# Patient Record
Sex: FEMALE | HISPANIC OR LATINO | Employment: UNEMPLOYED | URBAN - METROPOLITAN AREA
[De-identification: names, ages, dates, MRNs, and addresses within clinical notes are randomized per-mention and may not be internally consistent; named-entity substitution may affect disease eponyms.]

---

## 2017-01-02 ENCOUNTER — HOSPITAL ENCOUNTER (EMERGENCY)
Facility: HOSPITAL | Age: 37
Discharge: HOME | End: 2017-01-02
Attending: EMERGENCY MEDICINE | Admitting: EMERGENCY MEDICINE
Payer: COMMERCIAL

## 2017-01-02 ENCOUNTER — APPOINTMENT (EMERGENCY)
Dept: RADIOLOGY | Facility: HOSPITAL | Age: 37
End: 2017-01-02
Payer: COMMERCIAL

## 2017-01-02 VITALS
OXYGEN SATURATION: 98 % | DIASTOLIC BLOOD PRESSURE: 102 MMHG | TEMPERATURE: 97.8 F | SYSTOLIC BLOOD PRESSURE: 173 MMHG | HEART RATE: 70 BPM | RESPIRATION RATE: 18 BRPM

## 2017-01-02 DIAGNOSIS — S82.65XA CLOSED NONDISPLACED FRACTURE OF LATERAL MALLEOLUS OF LEFT FIBULA, INITIAL ENCOUNTER: Primary | ICD-10-CM

## 2017-01-02 PROCEDURE — 73590 X-RAY EXAM OF LOWER LEG: CPT

## 2017-01-02 PROCEDURE — 99283 EMERGENCY DEPT VISIT LOW MDM: CPT

## 2017-01-02 PROCEDURE — 73610 X-RAY EXAM OF ANKLE: CPT

## 2017-01-02 RX ORDER — TRAMADOL HYDROCHLORIDE 50 MG/1
50 TABLET ORAL ONCE
Status: COMPLETED | OUTPATIENT
Start: 2017-01-02 | End: 2017-01-02

## 2017-01-02 RX ORDER — MORPHINE SULFATE 15 MG/1
7.5 TABLET ORAL EVERY 4 HOURS PRN
Qty: 5 TABLET | Refills: 0 | Status: SHIPPED | OUTPATIENT
Start: 2017-01-02 | End: 2017-01-12

## 2017-01-02 RX ADMIN — TRAMADOL HYDROCHLORIDE 50 MG: 50 TABLET ORAL at 19:31

## 2017-11-23 ENCOUNTER — HOSPITAL ENCOUNTER (EMERGENCY)
Facility: HOSPITAL | Age: 37
Discharge: HOME | End: 2017-11-23
Attending: EMERGENCY MEDICINE | Admitting: EMERGENCY MEDICINE
Payer: COMMERCIAL

## 2017-11-23 ENCOUNTER — APPOINTMENT (EMERGENCY)
Dept: RADIOLOGY | Facility: HOSPITAL | Age: 37
End: 2017-11-23
Payer: COMMERCIAL

## 2017-11-23 VITALS
HEIGHT: 64 IN | SYSTOLIC BLOOD PRESSURE: 189 MMHG | HEART RATE: 76 BPM | WEIGHT: 200 LBS | BODY MASS INDEX: 34.15 KG/M2 | DIASTOLIC BLOOD PRESSURE: 112 MMHG | RESPIRATION RATE: 19 BRPM | OXYGEN SATURATION: 97 %

## 2017-11-23 DIAGNOSIS — B34.9 VIRAL SYNDROME: Primary | ICD-10-CM

## 2017-11-23 PROCEDURE — 96372 THER/PROPH/DIAG INJ SC/IM: CPT

## 2017-11-23 PROCEDURE — 71020 HB CHEST X-RAY 2VW FRONTAL&LATL: CPT

## 2017-11-23 PROCEDURE — 93005 ELECTROCARDIOGRAM TRACING: CPT

## 2017-11-23 PROCEDURE — 99283 EMERGENCY DEPT VISIT LOW MDM: CPT

## 2017-11-23 PROCEDURE — 94640 AIRWAY INHALATION TREATMENT: CPT

## 2017-11-23 RX ORDER — ALBUTEROL SULFATE 90 UG/1
2 AEROSOL, METERED RESPIRATORY (INHALATION) EVERY 4 HOURS PRN
Qty: 1 INHALER | Refills: 0 | Status: SHIPPED | OUTPATIENT
Start: 2017-11-23 | End: 2020-10-12 | Stop reason: ALTCHOICE

## 2017-11-23 RX ORDER — KETOROLAC TROMETHAMINE 30 MG/ML
15 INJECTION, SOLUTION INTRAMUSCULAR; INTRAVENOUS ONCE
Status: COMPLETED | OUTPATIENT
Start: 2017-11-23 | End: 2017-11-23

## 2017-11-23 RX ORDER — ALBUTEROL SULFATE 2.5 MG/3ML
5 SOLUTION RESPIRATORY (INHALATION) ONCE
Status: COMPLETED | OUTPATIENT
Start: 2017-11-23 | End: 2017-11-23

## 2017-11-23 RX ORDER — KETOROLAC TROMETHAMINE 30 MG/ML
15 INJECTION, SOLUTION INTRAMUSCULAR; INTRAVENOUS ONCE
Status: DISCONTINUED | OUTPATIENT
Start: 2017-11-23 | End: 2017-11-23

## 2017-11-23 RX ADMIN — KETOROLAC TROMETHAMINE 15 MG: 30 INJECTION, SOLUTION INTRAMUSCULAR at 06:28

## 2017-11-23 RX ADMIN — IPRATROPIUM BROMIDE 0.5 MG: 0.5 SOLUTION RESPIRATORY (INHALATION) at 06:22

## 2017-11-23 RX ADMIN — ALBUTEROL SULFATE 5 MG: 2.5 SOLUTION RESPIRATORY (INHALATION) at 06:22

## 2017-11-23 NOTE — ED PROVIDER NOTES
History  Chief Complaint   Patient presents with    Cough     Pt  reports "chest pain while coughing" for 3 days  Also complains of Sore Throat  Denies SOB, fever/chills  38-year-old female presents to the emergency department for evaluation of cough and congestion over the past several days  She has had increased nasal congestion sore throat and cough worse at night but is nonproductive  She has had no fevers chills  She has had no sick contacts  She is otherwise healthy  None       Past Medical History:   Diagnosis Date    Hypertension        History reviewed  No pertinent surgical history  History reviewed  No pertinent family history  I have reviewed and agree with the history as documented  Social History   Substance Use Topics    Smoking status: Current Some Day Smoker    Smokeless tobacco: Never Used    Alcohol use Yes      Comment: social         Review of Systems   Constitutional: Negative for appetite change, chills, fatigue and fever  HENT: Positive for congestion  Negative for sneezing and sore throat  Eyes: Negative for visual disturbance  Respiratory: Positive for cough and chest tightness  Negative for choking, shortness of breath and wheezing  Cardiovascular: Negative for chest pain and palpitations  Gastrointestinal: Negative for abdominal pain, constipation, diarrhea, nausea and vomiting  Genitourinary: Negative for difficulty urinating and dysuria  Neurological: Negative for dizziness, weakness, light-headedness, numbness and headaches  All other systems reviewed and are negative        Physical Exam  ED Triage Vitals [11/23/17 0517]   Temp Pulse Respirations Blood Pressure SpO2   -- 76 19 (!) 189/112 97 %      Temp src Heart Rate Source Patient Position - Orthostatic VS BP Location FiO2 (%)   -- Monitor Sitting Right arm --      Pain Score       7           Orthostatic Vital Signs  Vitals:    11/23/17 0517   BP: (!) 189/112   Pulse: 76   Patient Position - Orthostatic VS: Sitting       Physical Exam   Constitutional: She is oriented to person, place, and time  She appears well-developed and well-nourished  No distress  HENT:   Head: Normocephalic and atraumatic  Nose: Mucosal edema and rhinorrhea present  Mouth/Throat: Oropharynx is clear and moist    Eyes: EOM are normal  Pupils are equal, round, and reactive to light  Neck: No JVD present  No tracheal deviation present  Cardiovascular: Normal rate, regular rhythm, normal heart sounds and intact distal pulses  Exam reveals no gallop and no friction rub  No murmur heard  Pulmonary/Chest: Effort normal and breath sounds normal  No respiratory distress  She has no wheezes  She has no rales  Abdominal: Soft  Bowel sounds are normal  She exhibits no distension  There is no tenderness  There is no rebound and no guarding  Neurological: She is alert and oriented to person, place, and time  No cranial nerve deficit  She exhibits normal muscle tone  Skin: Skin is warm and dry  She is not diaphoretic  No pallor  Psychiatric: She has a normal mood and affect  Her behavior is normal    Nursing note and vitals reviewed  ED Medications  Medications   albuterol inhalation solution 5 mg (5 mg Nebulization Given 11/23/17 0622)   ipratropium (ATROVENT) 0 02 % inhalation solution 0 5 mg (0 5 mg Nebulization Given 11/23/17 0622)   ketorolac (TORADOL) 30 mg/mL injection 15 mg (15 mg Intramuscular Given 11/23/17 5893)       Diagnostic Studies  Results Reviewed     None                 XR chest 2 views   ED Interpretation by Boris Camarena MD (11/23 5590)   No active pulmonary disease      Final Result by Brenda Garcia DO (11/23 0376)      No active pulmonary disease           Workstation performed: [id number]               Procedures  Procedures      Phone Consults  ED Phone Contact    ED Course  ED Course                                MDM  Number of Diagnoses or Management Options  Viral syndrome: Diagnosis management comments: 18-year-old female with cough nasal congestion and chest tightness  Most likely viral will check chest x-ray for evidence of consolidation  Will give nebs for cough and nasal decongestants likely discharge with PCP follow-up    CritCare Time    Disposition  Final diagnoses:   Viral syndrome     Time reflects when diagnosis was documented in both MDM as applicable and the Disposition within this note     Time User Action Codes Description Comment    11/23/2017  7:25 AM Jevon Harkins Add [B34 9] Viral syndrome       ED Disposition     ED Disposition Condition Comment    Discharge  Lit Musa discharge to home/self care  Condition at discharge: Stable        Follow-up Information     Follow up With Specialties Details Why Contact Info    Page Mantilla MD Family Medicine  As needed [address] [facility] [address] [facility] [place]  [phone]          Discharge Medication List as of 11/23/2017  7:28 AM      START taking these medications    Details   albuterol (PROVENTIL HFA,VENTOLIN HFA) 90 mcg/act inhaler Inhale 2 puffs every 4 (four) hours as needed for wheezing, Starting Thu 11/23/2017, Print           No discharge procedures on file  ED Provider  Attending physically available and evaluated Lit Musa  BROOKS managed the patient along with the ED Attending      Electronically Signed by         Kyra Conner MD  Resident  11/25/17 0765

## 2017-11-23 NOTE — ED NOTES
EKG completed 3526  Viewed and signed by Dr Renée Maldonado on chart     [facility]  11/23/17 2889

## 2017-11-23 NOTE — DISCHARGE INSTRUCTIONS
Viral Syndrome, Ambulatory Care   GENERAL INFORMATION:   Viral syndrome  is a term healthcare providers use for general symptoms of a viral infection that has no clear cause  Common symptoms include the following:   · Fever and chills, or a rash    · Runny or stuffy nose     · Cough, sore throat, or hoarseness     · Headache, or pain and pressure around your eyes     · Muscle aches and joint pain     · Shortness of breath or wheezing     · Abdominal pain, cramps, and diarrhea     · Nausea, vomiting, or loss of appetite  Seek immediate care for the following symptoms:   · Continued vomiting and diarrhea    · Chest pain or trouble breathing  Treatment for a viral syndrome  may include medicines to decrease fever, cough, or stuffy nose  You may also need medicines to relieve a rash, itching, or help treat the viral infection  Manage your symptoms:  Drink liquids as directed to help prevent dehydration  Ask how much liquid to drink each day and which liquids are best for you  You may need to drink an oral rehydration solution (ORS)  An ORS has the right amounts of water, salts, and sugar you need to replace body fluids  Prevent the spread of viral syndrome:   · Wash your hands often  Use soap and water  Wash your hands after you use the bathroom, change a child's diapers, or sneeze  Wash your hands before you prepare or eat food  Use a gel hand  if soap and water are not available  · Wear a mask  to help prevent spreading the virus to others  · Cook and handle food properly  Cook food completely through  Clean food preparation surfaces with a disinfectant  · Ask about vaccinations  You may need an influenza (flu) vaccine, pneumococcal vaccine, or meningococcal vaccine  These vaccines help prevent the flu, pneumonia, and meningococcal disease  Follow up with your healthcare provider as directed:  Write down your questions so you remember to ask them during your visits     CARE AGREEMENT:   You have the right to help plan your care  Learn about your health condition and how it may be treated  Discuss treatment options with your caregivers to decide what care you want to receive  You always have the right to refuse treatment  The above information is an  only  It is not intended as medical advice for individual conditions or treatments  Talk to your doctor, nurse or pharmacist before following any medical regimen to see if it is safe and effective for you  © 2014 [address] is for End User's use only and may not be sold, redistributed or otherwise used for commercial purposes  All illustrations and images included in CareNotes® are the copyrighted property of [employer]  or [employer]

## 2017-11-23 NOTE — ED ATTENDING ATTESTATION
Shanice Alvarez MD, saw and evaluated the patient  I have discussed the patient with the resident/non-physician practitioner and agree with the resident's/non-physician practitioner's findings, Plan of Care, and MDM as documented in the resident's/non-physician practitioner's note, except where noted  All available labs and Radiology studies were reviewed  At this point I agree with the current assessment done in the Emergency Department  I have conducted an independent evaluation of this patient including a focused history of:    Emergency Department Note- Sean Black 43 y o  female MRN: [id number]    Unit/Bed#: GYN 1 Encounter: [id number]    Sean Black is a 43 y o  female who presents with   Chief Complaint   Patient presents with    Cough     Pt  reports "chest pain while coughing" for 3 days  Also complains of Sore Throat  Denies SOB, fever/chills  History of Present Illness   HPI:  Sean Black is a 43 y o  female who presents for evaluation of:  Cough for 3 days  When she coughs she has chest tightness and pain  Patient also also notes nasal congestion and discharge  Review of Systems    Historical Information   Past Medical History:   Diagnosis Date    Hypertension      History reviewed  No pertinent surgical history    Social History   History   Alcohol Use    Yes     Comment: social      History   Drug Use No     History   Smoking Status    Current Some Day Smoker   Smokeless Tobacco    Never Used     Family History: non-contributory    Meds/Allergies   all medications and allergies reviewed  No Known Allergies    Objective   First Vitals:   Blood Pressure: (!) 189/112 (11/23/17 0517)  Pulse: 76 (11/23/17 0517)  Respirations: 19 (11/23/17 0517)  Height: 5' 4" (162 6 cm) (11/23/17 0517)  Weight - Scale: 90 7 kg (200 lb) (11/23/17 0517)  SpO2: 97 % (11/23/17 0517)    Current Vitals:   Blood Pressure: (!) 189/112 (11/23/17 0517)  Pulse: 76 (11/23/17 3396)  Respirations: 19 (17)  Height: 5' 4" (162 6 cm) (17)  Weight - Scale: 90 7 kg (200 lb) (17)  SpO2: 97 % (17)    No intake or output data in the 24 hours ending 17    Invasive Devices          No matching active lines, drains, or airways          Physical Exam   Constitutional: She is oriented to person, place, and time  She appears well-developed and well-nourished  HENT:   Head: Normocephalic and atraumatic  Eyes: Conjunctivae are normal  Pupils are equal, round, and reactive to light  Pulmonary/Chest: Effort normal and breath sounds normal    Neurological: She is alert and oriented to person, place, and time  Skin: Skin is warm and dry  Psychiatric: She has a normal mood and affect  Her behavior is normal  Judgment and thought content normal    Nursing note and vitals reviewed  Medical Decision Makin  Viral URI    No results found for this or any previous visit (from the past 36 hour(s))  XR chest 2 views    (Results Pending)         Portions of the record may have been created with voice recognition software  Occasional wrong word or "sound a like" substitutions may have occurred due to the inherent limitations of voice recognition software  Read the chart carefully and recognize, using context, where substitutions have occurred

## 2017-11-24 LAB
ATRIAL RATE: 62 BPM
ATRIAL RATE: 67 BPM
P AXIS: 60 DEGREES
P AXIS: 66 DEGREES
PR INTERVAL: 152 MS
PR INTERVAL: 156 MS
QRS AXIS: 32 DEGREES
QRS AXIS: 32 DEGREES
QRSD INTERVAL: 86 MS
QRSD INTERVAL: 90 MS
QT INTERVAL: 402 MS
QT INTERVAL: 410 MS
QTC INTERVAL: 416 MS
QTC INTERVAL: 424 MS
T WAVE AXIS: -3 DEGREES
T WAVE AXIS: 1 DEGREES
VENTRICULAR RATE: 62 BPM
VENTRICULAR RATE: 67 BPM

## 2019-07-08 ENCOUNTER — HOSPITAL ENCOUNTER (EMERGENCY)
Facility: HOSPITAL | Age: 39
Discharge: HOME | End: 2019-07-08
Attending: EMERGENCY MEDICINE
Payer: COMMERCIAL

## 2019-07-08 VITALS
DIASTOLIC BLOOD PRESSURE: 92 MMHG | HEART RATE: 64 BPM | SYSTOLIC BLOOD PRESSURE: 157 MMHG | TEMPERATURE: 97.8 F | RESPIRATION RATE: 20 BRPM | BODY MASS INDEX: 34.33 KG/M2 | OXYGEN SATURATION: 96 % | WEIGHT: 200 LBS

## 2019-07-08 DIAGNOSIS — F07.81 POST CONCUSSIVE SYNDROME: Primary | ICD-10-CM

## 2019-07-08 PROCEDURE — 99283 EMERGENCY DEPT VISIT LOW MDM: CPT

## 2019-07-08 PROCEDURE — 99281 EMR DPT VST MAYX REQ PHY/QHP: CPT | Performed by: EMERGENCY MEDICINE

## 2019-07-08 NOTE — ED PROVIDER NOTES
History  Chief Complaint   Patient presents with    Headache     HA for approx 3 weeks with dizziness  C/o neck pain  Also c/o lower back pain that "shoots "     Guillermina Lora is a 34y o  year old Female with PMH of HTN presenting to the [facility] ED for headache  Worsening over 3 weeks since hitting head in swimming pool  Did not seek treatment at that time  Denies OCP, Vitamin A, steroid Rx  History provided by:  Patient   used: No    Headache   Pain location:  L parietal  Severity currently:  5/10  Severity at highest:  8/10  Onset quality:  Gradual  Duration:  3 weeks  Timing:  Constant  Progression:  Waxing and waning  Chronicity:  New  Similar to prior headaches: No Hx of headaches  Context: not exposure to bright light and not coughing    Context comment:  States started after diving into pool and striking head 3 weeks ago  Worse with bending forward  Relieved by:  Acetaminophen  Worsened by: Activity  Ineffective treatments:  Acetaminophen  Associated symptoms: blurred vision (States "blurring of vision") and dizziness (Not room-spinning)    Associated symptoms: no abdominal pain, no congestion, no cough, no diarrhea, no eye pain, no fever, no focal weakness, no nausea, no near-syncope, no neck pain, no neck stiffness, no photophobia, no seizures, no syncope and no weakness        Prior to Admission Medications   Prescriptions Last Dose Informant Patient Reported? Taking? albuterol (PROVENTIL HFA,VENTOLIN HFA) 90 mcg/act inhaler   No No   Sig: Inhale 2 puffs every 4 (four) hours as needed for wheezing      Facility-Administered Medications: None       Past Medical History:   Diagnosis Date    Hypertension        History reviewed  No pertinent surgical history  History reviewed  No pertinent family history  I have reviewed and agree with the history as documented      Social History     Tobacco Use    Smoking status: Current Some Day Smoker    Smokeless tobacco: Never Used   Substance Use Topics    Alcohol use: Not Currently     Comment: social     Drug use: No        Review of Systems   Constitutional: Negative for chills and fever  HENT: Negative for congestion, facial swelling, nosebleeds and rhinorrhea  Eyes: Positive for blurred vision (States "blurring of vision")  Negative for photophobia and pain  Respiratory: Negative for cough  Cardiovascular: Negative for syncope and near-syncope  Gastrointestinal: Negative for abdominal pain, diarrhea and nausea  Genitourinary: Negative for difficulty urinating, flank pain and hematuria  Currently on period   Musculoskeletal: Negative for arthralgias, neck pain and neck stiffness  Neurological: Positive for dizziness (Not room-spinning) and headaches  Negative for focal weakness, seizures, syncope, weakness and light-headedness  Psychiatric/Behavioral: Negative for agitation  All other systems reviewed and are negative  Physical Exam  ED Triage Vitals   Temp Pulse Respirations Blood Pressure SpO2   -- 07/08/19 1543 07/08/19 1543 07/08/19 1543 07/08/19 1543    64 20 157/92 96 %      Temp src Heart Rate Source Patient Position - Orthostatic VS BP Location FiO2 (%)   -- 07/08/19 1543 07/08/19 1543 07/08/19 1543 --    Monitor Sitting Right arm       Pain Score       07/08/19 1542       7             Orthostatic Vital Signs  Vitals:    07/08/19 1543   BP: 157/92   Pulse: 64   Patient Position - Orthostatic VS: Sitting       Physical Exam   Constitutional: She is oriented to person, place, and time  She appears well-developed and well-nourished  No distress  Eyes: Pupils are equal, round, and reactive to light  EOM are normal    No AV nicking, bulging, edema     Neck: Normal range of motion  Neck supple  Cardiovascular: Normal rate and regular rhythm  No murmur heard  Pulmonary/Chest: Effort normal and breath sounds normal  No respiratory distress  She has no wheezes   She has no rales  Abdominal: Soft  Bowel sounds are normal  She exhibits no distension  There is no tenderness  There is no rebound and no guarding  Musculoskeletal: Normal range of motion  She exhibits no edema or tenderness  Neurological: She is alert and oriented to person, place, and time  No cranial nerve deficit or sensory deficit  She exhibits normal muscle tone  Coordination (Normal finger to nose, heel to shin) normal    Negative Carlos-Hallpike   Skin: Capillary refill takes less than 2 seconds  She is not diaphoretic  Psychiatric: She has a normal mood and affect  Nursing note and vitals reviewed  ED Medications  Medications - No data to display    Diagnostic Studies  Results Reviewed     None                 No orders to display         Procedures  Procedures        ED Course  ED Course as of Jul 08 1642   Mon Jul 08, 2019   1636 Bedside Optic nerve US performed  R optic nerve diameter  5cm  Reassured patient and discussed return to ED precautions including FAST s/s  Encouraged hydration and outpatient F/U with PCP  Patient left without receiving discharge paperwork  MDM    Disposition  Final diagnoses:   Post concussive syndrome     Time reflects when diagnosis was documented in both MDM as applicable and the Disposition within this note     Time User Action Codes Description Comment    7/8/2019  4:42 PM Mariann Waddell Add [F07 81] Post concussive syndrome       ED Disposition     ED Disposition Condition Date/Time Comment    Discharge Stable Mon Jul 8, 2019  4:41 PM Nella Chisholm discharge to home/self care              Follow-up Information     Follow up With Specialties Details Why Contact Info    Tomeka Fonseca MD Family Medicine Schedule an appointment as soon as possible for a visit  To make appointment for reevaluation in 3-5 days if symptoms do not imrove  [address]  [place] [address] [place] [address]  [phone]            Patient's Medications   Discharge Prescriptions    No medications on file     No discharge procedures on file  ED Provider  Attending physically available and evaluated Fabiano Apodaca I managed the patient along with the ED Attending      Electronically Signed by Geoff Edward 162, DO  07/08/19 1945

## 2019-07-08 NOTE — ED NOTES
Pt notes she jumped into the pool approx 2 weeks ago and thinks she hit her head     Elena Gaona RN  07/08/19 0402

## 2019-07-10 ENCOUNTER — DOCUMENTATION (OUTPATIENT)
Dept: EMERGENCY DEPT | Facility: HOSPITAL | Age: 39
End: 2019-07-10

## 2019-08-17 ENCOUNTER — HOSPITAL ENCOUNTER (EMERGENCY)
Facility: HOSPITAL | Age: 39
Discharge: HOME | End: 2019-08-17
Attending: EMERGENCY MEDICINE | Admitting: EMERGENCY MEDICINE
Payer: COMMERCIAL

## 2019-08-17 VITALS
SYSTOLIC BLOOD PRESSURE: 164 MMHG | RESPIRATION RATE: 18 BRPM | BODY MASS INDEX: 36.37 KG/M2 | TEMPERATURE: 98.4 F | DIASTOLIC BLOOD PRESSURE: 103 MMHG | OXYGEN SATURATION: 98 % | HEART RATE: 96 BPM | WEIGHT: 211.86 LBS

## 2019-08-17 DIAGNOSIS — B02.9 SHINGLES: Primary | ICD-10-CM

## 2019-08-17 PROCEDURE — 99283 EMERGENCY DEPT VISIT LOW MDM: CPT

## 2019-08-17 PROCEDURE — 99284 EMERGENCY DEPT VISIT MOD MDM: CPT | Performed by: EMERGENCY MEDICINE

## 2019-08-17 RX ORDER — GABAPENTIN 300 MG/1
300 CAPSULE ORAL 3 TIMES DAILY
Qty: 21 CAPSULE | Refills: 0 | Status: SHIPPED | OUTPATIENT
Start: 2019-08-17 | End: 2020-10-12 | Stop reason: ALTCHOICE

## 2019-08-17 RX ORDER — GABAPENTIN 300 MG/1
300 CAPSULE ORAL 3 TIMES DAILY
Qty: 21 CAPSULE | Refills: 0 | Status: SHIPPED | OUTPATIENT
Start: 2019-08-17 | End: 2019-08-17 | Stop reason: SDUPTHER

## 2019-08-17 RX ORDER — ACYCLOVIR 400 MG/1
800 TABLET ORAL
Qty: 70 TABLET | Refills: 0 | Status: SHIPPED | OUTPATIENT
Start: 2019-08-17 | End: 2021-04-14 | Stop reason: ALTCHOICE

## 2019-08-17 NOTE — ED PROVIDER NOTES
History  Chief Complaint   Patient presents with    Shoulder Pain     Patient presents to the E R  with worsening right shoulder pain x 1 5 weeks and rash x 4 days  40yo Female presents with rash on right shoulder which she noticed 4 days ago  She additionally reports right shoulder pain with limited mobility 2 weeks ago, which has since resolved  The rash lesions are itchy with a burning sensation  Patient denies fever, headache, or photophobia  Shoulder has no diffuse erythema or warmth, no loss of joint mobility  Prior to Admission Medications   Prescriptions Last Dose Informant Patient Reported? Taking? albuterol (PROVENTIL HFA,VENTOLIN HFA) 90 mcg/act inhaler   No No   Sig: Inhale 2 puffs every 4 (four) hours as needed for wheezing      Facility-Administered Medications: None       Past Medical History:   Diagnosis Date    Hypertension        History reviewed  No pertinent surgical history  History reviewed  No pertinent family history  I have reviewed and agree with the history as documented  Social History     Tobacco Use    Smoking status: Current Some Day Smoker    Smokeless tobacco: Never Used   Substance Use Topics    Alcohol use: Yes     Comment: social     Drug use: No        Review of Systems   Constitutional: Negative for activity change, appetite change, chills, diaphoresis, fatigue and fever  HENT: Negative for congestion, rhinorrhea, sinus pressure, sinus pain, sneezing, sore throat, trouble swallowing and voice change  Eyes: Negative for visual disturbance  Respiratory: Negative for choking, chest tightness, shortness of breath, wheezing and stridor  Cardiovascular: Negative for chest pain, palpitations and leg swelling  Gastrointestinal: Negative for abdominal distention, abdominal pain, blood in stool, nausea and vomiting  Endocrine: Negative for polyuria     Genitourinary: Negative for difficulty urinating, dyspareunia, dysuria, enuresis, flank pain, frequency, hematuria and vaginal bleeding  Musculoskeletal: Positive for arthralgias  Negative for gait problem, neck pain and neck stiffness  Skin: Positive for rash  Allergic/Immunologic: Negative for food allergies  Neurological: Negative for dizziness, tremors, speech difficulty, weakness, light-headedness and headaches  Psychiatric/Behavioral: Negative for confusion  The patient is not nervous/anxious  Physical Exam  ED Triage Vitals [08/17/19 0753]   Temperature Pulse Respirations Blood Pressure SpO2   98 4 °F (36 9 °C) 96 18 (!) 164/103 98 %      Temp Source Heart Rate Source Patient Position - Orthostatic VS BP Location FiO2 (%)   Oral Monitor Lying Right arm --      Pain Score       3             Orthostatic Vital Signs  Vitals:    08/17/19 0753   BP: (!) 164/103   Pulse: 96   Patient Position - Orthostatic VS: Lying       Physical Exam   Constitutional: She is oriented to person, place, and time  She appears well-developed and well-nourished  No distress  HENT:   Head: Normocephalic and atraumatic  Right Ear: External ear normal    Left Ear: External ear normal    Nose: Nose normal    Eyes: Pupils are equal, round, and reactive to light  Conjunctivae and EOM are normal  No scleral icterus  Neck: Normal range of motion  Neck supple  Cardiovascular: Normal rate, regular rhythm, normal heart sounds and intact distal pulses  No murmur heard  Pulmonary/Chest: Effort normal and breath sounds normal  No stridor  No respiratory distress  She has no wheezes  She has no rales  Abdominal: Soft  Bowel sounds are normal  She exhibits no distension and no mass  There is no tenderness  There is no rebound and no guarding  Musculoskeletal: Normal range of motion  She exhibits no edema, tenderness or deformity  Lymphadenopathy:     She has no cervical adenopathy  Neurological: She is alert and oriented to person, place, and time  No cranial nerve deficit or sensory deficit     Skin: Skin is warm and dry  Capillary refill takes less than 2 seconds  Rash (focal raised erythematous patches) noted  She is not diaphoretic  Psychiatric: She has a normal mood and affect  Her behavior is normal  Judgment and thought content normal    Nursing note and vitals reviewed  ED Medications  Medications - No data to display    Diagnostic Studies  Results Reviewed     None                 No orders to display         Procedures  Procedures        ED Course                               MDM  Number of Diagnoses or Management Options  Shingles:   Diagnosis management comments: Clinically consistent with Shingles  Treating with Gabapentin and acyclovir for 1 week  Followup with primary care  Disposition  Final diagnoses:   Shingles     Time reflects when diagnosis was documented in both MDM as applicable and the Disposition within this note     Time User Action Codes Description Comment    8/17/2019  8:41 AM Meri Montoya Add [B02 9] Shingles       ED Disposition     ED Disposition Condition Date/Time Comment    Discharge Stable Sat Aug 17, 2019  8:41 AM Waite Reil discharge to home/self care  Follow-up Information     Follow up With Specialties Details Why Contact Info    Ariel Schaefer MD Family Medicine Call in 1 day As needed [address] [facility] [address] [facility]  [phone]            Discharge Medication List as of 8/17/2019  8:48 AM      START taking these medications    Details   acyclovir (ZOVIRAX) 400 MG tablet Take 2 tablets (800 mg total) by mouth 5 (five) times a day for 7 days, Starting Sat 8/17/2019, Until Sat 8/24/2019, Print      gabapentin (NEURONTIN) 300 mg capsule Take 1 capsule (300 mg total) by mouth 3 (three) times a day for 7 days For post-herpetic neuralgia:  Take 1 tablet on day 1,  Then take 2 tablets on day 2, Then take 3 tablets on day 3 and every day after that as instructed by your doctor , Starting Sat  8/17/2019, Until Sat 8/24/2019, Print CONTINUE these medications which have NOT CHANGED    Details   albuterol (PROVENTIL HFA,VENTOLIN HFA) 90 mcg/act inhaler Inhale 2 puffs every 4 (four) hours as needed for wheezing, Starting Thu 11/23/2017, Print           No discharge procedures on file  ED Provider  Attending physically available and evaluated Edie Bolus  I managed the patient along with the ED Attending      Electronically Signed by         Cheyanne Murcia MD  08/17/19 6529

## 2019-08-17 NOTE — ED ATTENDING ATTESTATION
Meseret Speaker, DO, saw and evaluated the patient  I have discussed the patient with the resident/non-physician practitioner and agree with the resident's/non-physician practitioner's findings, Plan of Care, and MDM as documented in the resident's/non-physician practitioner's note, except where noted  All available labs and Radiology studies were reviewed  I was present for key portions of any procedure(s) performed by the resident/non-physician practitioner and I was immediately available to provide assistance  At this point I agree with the current assessment done in the Emergency Department  I have conducted an independent evaluation of this patient a history and physical is as follows:    Patient is a 68-year-old female complaining of a right shoulder rash  Rash is painful  Patient complained of generalized right shoulder pain influenza 1 week ago and then 2 days ago noticed the rash developed  Will place picture of rash and patient's chart  The rash consistent with herpetic zoster  Will treat with analgesia, antiviral is, follow up with primary care physician      Critical Care Time  Procedures

## 2019-08-26 ENCOUNTER — APPOINTMENT (OUTPATIENT)
Dept: LAB | Facility: HOSPITAL | Age: 39
End: 2019-08-26
Payer: COMMERCIAL

## 2019-08-26 ENCOUNTER — TRANSCRIBE ORDERS (OUTPATIENT)
Dept: LAB | Facility: HOSPITAL | Age: 39
End: 2019-08-26

## 2019-08-26 DIAGNOSIS — Z00.00 ROUTINE GENERAL MEDICAL EXAMINATION AT A HEALTH CARE FACILITY: Primary | ICD-10-CM

## 2019-08-26 LAB
ALBUMIN SERPL BCP-MCNC: 4 G/DL (ref 3.5–5)
ALP SERPL-CCNC: 83 U/L (ref 46–116)
ALT SERPL W P-5'-P-CCNC: 27 U/L (ref 12–78)
ANION GAP SERPL CALCULATED.3IONS-SCNC: 9 MMOL/L (ref 4–13)
AST SERPL W P-5'-P-CCNC: 23 U/L (ref 5–45)
BASOPHILS # BLD AUTO: 0.02 THOUSANDS/ΜL (ref 0–0.1)
BASOPHILS NFR BLD AUTO: 0 % (ref 0–1)
BILIRUB SERPL-MCNC: 0.88 MG/DL (ref 0.2–1)
BUN SERPL-MCNC: 14 MG/DL (ref 5–25)
CALCIUM SERPL-MCNC: 9.4 MG/DL (ref 8.3–10.1)
CHLORIDE SERPL-SCNC: 107 MMOL/L (ref 100–108)
CHOLEST SERPL-MCNC: 137 MG/DL (ref 50–200)
CO2 SERPL-SCNC: 25 MMOL/L (ref 21–32)
CREAT SERPL-MCNC: 0.65 MG/DL (ref 0.6–1.3)
EOSINOPHIL # BLD AUTO: 0.31 THOUSAND/ΜL (ref 0–0.61)
EOSINOPHIL NFR BLD AUTO: 5 % (ref 0–6)
ERYTHROCYTE [DISTWIDTH] IN BLOOD BY AUTOMATED COUNT: 14.9 % (ref 11.6–15.1)
GFR SERPL CREATININE-BSD FRML MDRD: 111 ML/MIN/1.73SQ M
GLUCOSE P FAST SERPL-MCNC: 89 MG/DL (ref 65–99)
HCT VFR BLD AUTO: 41.2 % (ref 34.8–46.1)
HDLC SERPL-MCNC: 57 MG/DL (ref 40–60)
HGB BLD-MCNC: 13 G/DL (ref 11.5–15.4)
IMM GRANULOCYTES # BLD AUTO: 0.03 THOUSAND/UL (ref 0–0.2)
IMM GRANULOCYTES NFR BLD AUTO: 0 % (ref 0–2)
LDLC SERPL CALC-MCNC: 62 MG/DL (ref 0–100)
LYMPHOCYTES # BLD AUTO: 2.12 THOUSANDS/ΜL (ref 0.6–4.47)
LYMPHOCYTES NFR BLD AUTO: 31 % (ref 14–44)
MCH RBC QN AUTO: 31.8 PG (ref 26.8–34.3)
MCHC RBC AUTO-ENTMCNC: 31.6 G/DL (ref 31.4–37.4)
MCV RBC AUTO: 101 FL (ref 82–98)
MONOCYTES # BLD AUTO: 0.51 THOUSAND/ΜL (ref 0.17–1.22)
MONOCYTES NFR BLD AUTO: 8 % (ref 4–12)
NEUTROPHILS # BLD AUTO: 3.82 THOUSANDS/ΜL (ref 1.85–7.62)
NEUTS SEG NFR BLD AUTO: 56 % (ref 43–75)
NONHDLC SERPL-MCNC: 80 MG/DL
NRBC BLD AUTO-RTO: 0 /100 WBCS
PLATELET # BLD AUTO: 228 THOUSANDS/UL (ref 149–390)
PMV BLD AUTO: 11 FL (ref 8.9–12.7)
POTASSIUM SERPL-SCNC: 4.1 MMOL/L (ref 3.5–5.3)
PROT SERPL-MCNC: 7.6 G/DL (ref 6.4–8.2)
RBC # BLD AUTO: 4.09 MILLION/UL (ref 3.81–5.12)
SODIUM SERPL-SCNC: 141 MMOL/L (ref 136–145)
TRIGL SERPL-MCNC: 89 MG/DL
WBC # BLD AUTO: 6.81 THOUSAND/UL (ref 4.31–10.16)

## 2019-08-26 PROCEDURE — 80061 LIPID PANEL: CPT

## 2019-08-26 PROCEDURE — 80053 COMPREHEN METABOLIC PANEL: CPT

## 2019-08-26 PROCEDURE — 36415 COLL VENOUS BLD VENIPUNCTURE: CPT

## 2019-08-26 PROCEDURE — 85025 COMPLETE CBC W/AUTO DIFF WBC: CPT

## 2019-09-14 ENCOUNTER — HOSPITAL ENCOUNTER (OUTPATIENT)
Dept: RADIOLOGY | Facility: HOSPITAL | Age: 39
Discharge: HOME | End: 2019-09-14
Payer: COMMERCIAL

## 2019-09-14 ENCOUNTER — TRANSCRIBE ORDERS (OUTPATIENT)
Dept: RADIOLOGY | Facility: HOSPITAL | Age: 39
End: 2019-09-14

## 2019-09-14 DIAGNOSIS — M25.511 RIGHT SHOULDER PAIN, UNSPECIFIED CHRONICITY: Primary | ICD-10-CM

## 2019-09-14 DIAGNOSIS — M25.511 RIGHT SHOULDER PAIN, UNSPECIFIED CHRONICITY: ICD-10-CM

## 2019-09-14 PROCEDURE — 73030 X-RAY EXAM OF SHOULDER: CPT

## 2020-09-29 ENCOUNTER — OFFICE VISIT (OUTPATIENT)
Dept: BARIATRICS | Facility: CLINIC | Age: 40
End: 2020-09-29

## 2020-09-29 VITALS
RESPIRATION RATE: 16 BRPM | TEMPERATURE: 97.1 F | DIASTOLIC BLOOD PRESSURE: 82 MMHG | HEIGHT: 64 IN | WEIGHT: 216.1 LBS | HEART RATE: 80 BPM | BODY MASS INDEX: 36.89 KG/M2 | SYSTOLIC BLOOD PRESSURE: 132 MMHG

## 2020-09-29 DIAGNOSIS — E66.9 OBESITY, CLASS II, BMI 35-39.9: Primary | ICD-10-CM

## 2020-09-29 DIAGNOSIS — Z01.818 PREOP EXAMINATION: ICD-10-CM

## 2020-09-29 PROCEDURE — RECHECK

## 2020-09-29 RX ORDER — AMLODIPINE BESYLATE 5 MG/1
5 TABLET ORAL DAILY
COMMUNITY

## 2020-09-29 RX ORDER — LOSARTAN POTASSIUM 100 MG/1
100 TABLET ORAL DAILY
Status: ON HOLD | COMMUNITY
End: 2021-05-19 | Stop reason: SDUPTHER

## 2020-09-29 NOTE — PROGRESS NOTES
Bariatric Behavioral Health Evaluation    Presenting Problem- Pt wants surgery to improve her  health and hopes to lower her blood pressure  She reports also feeling fatigued easily  Is the patient seeking Bariatric Surgery Eval? Yes Her friend has had bariatric surgery   She attended her online seminar     Realizes [address] [place] Requirements? Somewhat     Pre-morbid level of function and history of present illness: Within last 6 years pt reports she  started to gain weight  She is a  and and doesn't exercise  She reports  feeling like she is "starving" at night" and eats a lot when she gets home     Psychiatric/Psychological Treatment Diagnosis: Pt denies this     Outpatient Counselor No     Psychiatrist No     Have you had Inpatient Treatment? No    Family Constellation Pt lives with her 3 children ages 15, 12 and 21  No current significant other  Mother living at 61 and has HBP , diabetes and high cholesterol  Father  living at 68  She reports father may have prostate cancer  She is not in communication with him  She states she has 3 brothers  She reports she is not aware of any health issues for them  She denies any D & A issues as well as MH issues for family members listed above  Domestic Violence No     Are they currently in the situation? No    Abuse History:  Patient denies this    Additional comments/stressors related to family/relationships/peer support - Pt reports her support system to be comprised of her mother-in-law and brother  Current strssor centers around her weight      Physical/Psychological Assessment:     Appearance: appropriate  Sociability: average  Affect: appropriate  Mood: calm  Thought Process: coherent  Speech: normal  Content: no impairment  Orientation: person  Yes , place  Yes , time  Yes , normal attention span  Yes , normal memory  Yes  , decreased in concentration ability  No and normal judgement  Yes   Insight: intellectual  good    Risk Assessment: None    Risk of Harm to Self or Others: Pt denies any thoughts of hurting self or others currently as well as historically  She denies any hx of any suicide attempts  Access to weapons no     Weapons secured by N/A      Based on the previous information, the client presents the following risk of harm to self or others: low      BARIATRIC SURGERY EDUCATION CHECKLIST     I have received education related to my bariatric surgery process and understand:     Patients may be required to complete a psychiatric evaluation and receive clearance for surgery from their psychiatrist      Patients who undergo weight loss surgery are at higher risk of increased mental health concerns and suicide attempts      Patients may be required to complete a full substance abuse evaluation and then complete all treatment recommendations prior to surgery      If diagnosis of abuse/dependence results, patient may be required to remain sober for one (1) year before having bariatric surgery      Patients on psychiatric medications should check with their provider to discuss psychiatric medications and the changes in absorption    Patient should discuss all time release medications with provider and take all medications as prescribed      The recommendation is that there is no use of  any tobacco products, Hookah or  vapes for the bariatric post-operation patient      Bariatric surgery patients should not consume alcohol as a post-operative patient as it may increase risk of numerous health conditions including but not limited to alcohol abuse and ulcers      There is a possibility of weight regain if patient does not follow all program guidelines and recommendations      Bariatric surgery patients should exercise thirty (30) to sixty (60) minutes per day to maintain post-surgical weight loss      Research indicates that bariatric patients are more successful when they see a therapist for up to two (2) years post-op      Patients will follow all medical and dietary recommendations provided      Patient will keep all scheduled appointments and follow up with their physician for a minimum of five (5) years      Patient will take all vitamins as recommended  Post-operative vitamins are life-long      Patient reviewed Bariatric Surgery Education Checklist and agrees they have received education on these issues  Completed Behavioral Health Assessment  Provided patient education as needed  Patient denies symptoms of  Axis 1 diagnosis of depression/anxiety and denies currently taking medication prescribed by PCP or psychiatrist  Patient  meets criteria for [facility] bariatric  surgery program and is therefore referred to surgeon

## 2020-09-29 NOTE — PROGRESS NOTES
Bariatric Nutrition Assessment Note    Type of surgery    Preop  Surgery Date: TBD  Surgeon: Undecided    Nutrition Assessment   Mariela Dietrich  37 y o   female     Wt with BMI of 25: 145 lbs  Pre-Op Excess Wt: 71 lbs  Temperature (!) 97 1 °F (36 2 °C), height 5' 4" (1 626 m), weight 98 kg (216 lb 1 6 oz)  Body mass index is 37 09 kg/m²  [place] St Jeor Equation-2100 cals to maintain wt                                        1600 cals to lose 1 lb/week    Weight History   Onset of Obesity: Adult  Family history of obesity: Yes  Wt Loss Attempts: Exercise  FAD Diets (Cabbage soup, Grapefruit, Cleanse, etc )  High Protein/Low CHO diets (Atkins, Union, etc )  Self Created Diets (Portion Control, Healthy Food Choices, etc )  Maximum Wt Lost: 12 lbs    Review of History and Medications   Past Medical History:   Diagnosis Date    Hypertension      No past surgical history on file    Social History     Socioeconomic History    Marital status: Single     Spouse name: Not on file    Number of children: Not on file    Years of education: Not on file    Highest education level: Not on file   Occupational History    Not on file   Social Needs    Financial resource strain: Not on file    Food insecurity     Worry: Not on file     Inability: Not on file    Transportation needs     Medical: Not on file     Non-medical: Not on file   Tobacco Use    Smoking status: Current Some Day Smoker    Smokeless tobacco: Never Used   Substance and Sexual Activity    Alcohol use: Yes     Comment: social     Drug use: No    Sexual activity: Not on file   Lifestyle    Physical activity     Days per week: Not on file     Minutes per session: Not on file    Stress: Not on file   Relationships    Social connections     Talks on phone: Not on file     Gets together: Not on file     Attends Jewish service: Not on file     Active member of club or organization: Not on file     Attends meetings of clubs or organizations: Not on file     Relationship status: Not on file    Intimate partner violence     Fear of current or ex partner: Not on file     Emotionally abused: Not on file     Physically abused: Not on file     Forced sexual activity: Not on file   Other Topics Concern    Not on file   Social History Narrative    Not on file       Current Outpatient Medications:     acyclovir (ZOVIRAX) 400 MG tablet, Take 2 tablets (800 mg total) by mouth 5 (five) times a day for 7 days, Disp: 70 tablet, Rfl: 0    albuterol (PROVENTIL HFA,VENTOLIN HFA) 90 mcg/act inhaler, Inhale 2 puffs every 4 (four) hours as needed for wheezing, Disp: 1 Inhaler, Rfl: 0    gabapentin (NEURONTIN) 300 mg capsule, Take 1 capsule (300 mg total) by mouth 3 (three) times a day for 7 days, Disp: 21 capsule, Rfl: 0  Food Intake and Lifestyle Assessment   Food Intake Assessment completed via usual diet recall  Breakfast: bagel with butter   Snack: 0   Lunch: [employer] or [employer]-chicken nuggets or sandwich and fries  Snack: 0  Dinner: meat, rice, and beans or salad or vegetables  Snack: occasionally fruits  Beverage intake: water and sparkling water, regular soda, coffee/tea and alcohol  Protein supplement: 0  Estimated protein intake per day: >65g  Estimated fluid intake per day: 50 oz  Meals eaten away from home: 4-5 times/week  Typical meal pattern: 3 meals per day and 1-2 snacks per day  Eating Behaviors: Consumption of high calorie/ high fat foods, Consumption of high calorie beverages and Large portion sizes  Food allergies or intolerances: No Known Allergies  Cultural or Religion considerations: Eunice    Physical Assessment  Physical Activity  Types of exercise: None  Current physical limitations: none    Psychosocial Assessment   Support systems: Hasn't told anyone yet  Socioeconomic factors: works as a     Nutrition Diagnosis  Diagnosis: Overweight / Obesity (NC-3 3)  Related to: Physical inactivity and Excessive energy intake  As Evidenced by: BMI >25     Nutrition Prescription: Recommend the following diet  Low fat, Low sugar and High protein    Interventions and Teaching   Discussed pre-op and post-op nutrition guidelines  Patient educated and handouts provided  Surgical changes to stomach / GI  Capacity of post-surgery stomach  Diet progression  Adequate hydration  Sugar and fat restriction to decrease "dumping syndrome"  Fat restriction to decrease steatorrhea  Expected weight loss  Weight loss plateaus/ possibility of weight regain  Exercise  Suggestions for pre-op diet  Nutrition considerations after surgery  Protein supplements  Meal planning and preparation  Appropriate carbohydrate, protein, and fat intake, and food/fluid choices to maximize safe weight loss, nutrient intake, and tolerance   Dietary and lifestyle changes  Possible problems with poor eating habits  Intuitive eating  Techniques for self monitoring and keeping daily food journal  Potential for food intolerance after surgery, and ways to deal with them including: lactose intolerance, nausea, reflux, vomiting, diarrhea, food intolerance, appetite changes, gas  Vitamin / Mineral supplementation of Multivitamin with minerals and Vitamin D    Education provided to: patient    Barriers to learning: No barriers identified  Readiness to change: preparation    Prior research on procedure: internet and discussed with provider    Comprehension: verbalizes understanding     Expected Compliance: good  Recommendations  Pt is an appropriate candidate for surgery   Yes    Evaluation / Monitoring  Dietitian to Monitor: Eating pattern as discussed Body weight Lab values Physical activity    Goals  Eliminate sugar sweetened beverages, Food journal, Exercise 30 minutes 5 times per week, Complete lession plans 1-6 and Eat 3 meals per day    Time Spent:   1 Hour

## 2020-10-05 ENCOUNTER — TRANSCRIBE ORDERS (OUTPATIENT)
Dept: SLEEP CENTER | Facility: CLINIC | Age: 40
End: 2020-10-05

## 2020-10-05 DIAGNOSIS — E66.9 OBESITY, UNSPECIFIED: Primary | ICD-10-CM

## 2020-10-08 ENCOUNTER — OFFICE VISIT (OUTPATIENT)
Dept: BARIATRICS | Facility: CLINIC | Age: 40
End: 2020-10-08
Payer: COMMERCIAL

## 2020-10-08 VITALS
DIASTOLIC BLOOD PRESSURE: 90 MMHG | SYSTOLIC BLOOD PRESSURE: 146 MMHG | BODY MASS INDEX: 37.03 KG/M2 | HEIGHT: 64 IN | WEIGHT: 216.9 LBS | TEMPERATURE: 97.8 F | RESPIRATION RATE: 16 BRPM | HEART RATE: 84 BPM

## 2020-10-08 DIAGNOSIS — E66.9 OBESITY, CLASS II, BMI 35-39.9: Primary | ICD-10-CM

## 2020-10-08 DIAGNOSIS — I10 HTN (HYPERTENSION): ICD-10-CM

## 2020-10-08 DIAGNOSIS — Z72.0 TOBACCO USE: ICD-10-CM

## 2020-10-08 PROBLEM — E66.812 OBESITY, CLASS II, BMI 35-39.9: Status: ACTIVE | Noted: 2020-10-08

## 2020-10-08 PROCEDURE — 99213 OFFICE O/P EST LOW 20 MIN: CPT | Performed by: PHYSICIAN ASSISTANT

## 2020-10-12 ENCOUNTER — OFFICE VISIT (OUTPATIENT)
Dept: SLEEP CENTER | Facility: CLINIC | Age: 40
End: 2020-10-12
Payer: COMMERCIAL

## 2020-10-12 VITALS
HEART RATE: 74 BPM | HEIGHT: 64 IN | WEIGHT: 221.2 LBS | DIASTOLIC BLOOD PRESSURE: 82 MMHG | BODY MASS INDEX: 37.76 KG/M2 | TEMPERATURE: 97.8 F | SYSTOLIC BLOOD PRESSURE: 122 MMHG

## 2020-10-12 DIAGNOSIS — I10 ESSENTIAL HYPERTENSION: ICD-10-CM

## 2020-10-12 DIAGNOSIS — E66.09 CLASS 2 OBESITY DUE TO EXCESS CALORIES WITHOUT SERIOUS COMORBIDITY WITH BODY MASS INDEX (BMI) OF 37.0 TO 37.9 IN ADULT: ICD-10-CM

## 2020-10-12 DIAGNOSIS — R06.83 SNORING: Primary | ICD-10-CM

## 2020-10-12 DIAGNOSIS — Z01.818 PREOP EXAMINATION: ICD-10-CM

## 2020-10-12 DIAGNOSIS — E66.9 OBESITY, CLASS II, BMI 35-39.9: ICD-10-CM

## 2020-10-12 PROCEDURE — 99244 OFF/OP CNSLTJ NEW/EST MOD 40: CPT | Performed by: INTERNAL MEDICINE

## 2020-11-12 ENCOUNTER — OFFICE VISIT (OUTPATIENT)
Dept: BARIATRICS | Facility: CLINIC | Age: 40
End: 2020-11-12
Payer: COMMERCIAL

## 2020-11-12 VITALS
RESPIRATION RATE: 16 BRPM | WEIGHT: 215 LBS | TEMPERATURE: 97.9 F | DIASTOLIC BLOOD PRESSURE: 94 MMHG | SYSTOLIC BLOOD PRESSURE: 146 MMHG | HEART RATE: 75 BPM | BODY MASS INDEX: 36.7 KG/M2 | HEIGHT: 64 IN

## 2020-11-12 DIAGNOSIS — E66.9 OBESITY, CLASS II, BMI 35-39.9: Primary | ICD-10-CM

## 2020-11-12 DIAGNOSIS — I10 ESSENTIAL HYPERTENSION: ICD-10-CM

## 2020-11-12 PROCEDURE — 99214 OFFICE O/P EST MOD 30 MIN: CPT | Performed by: PHYSICIAN ASSISTANT

## 2020-12-10 ENCOUNTER — OFFICE VISIT (OUTPATIENT)
Dept: BARIATRICS | Facility: CLINIC | Age: 40
End: 2020-12-10

## 2020-12-10 VITALS — HEIGHT: 64 IN | BODY MASS INDEX: 36.4 KG/M2 | TEMPERATURE: 96.7 F | WEIGHT: 213.2 LBS

## 2020-12-10 DIAGNOSIS — E66.9 OBESITY, CLASS II, BMI 35-39.9: Primary | ICD-10-CM

## 2020-12-10 PROCEDURE — RECHECK

## 2021-01-21 ENCOUNTER — OFFICE VISIT (OUTPATIENT)
Dept: BARIATRICS | Facility: CLINIC | Age: 41
End: 2021-01-21

## 2021-01-21 VITALS — HEIGHT: 64 IN | BODY MASS INDEX: 36.95 KG/M2 | WEIGHT: 216.4 LBS

## 2021-01-21 DIAGNOSIS — E66.9 OBESITY, CLASS II, BMI 35-39.9: Primary | ICD-10-CM

## 2021-01-21 PROCEDURE — RECHECK

## 2021-01-21 NOTE — PROGRESS NOTES
Bariatric Behavioral Health Evaluation    Presenting Problem- Pt wants surgery to improve her  health and hopes to lower her blood pressure  She reports also feeling fatigued easily  Is the patient seeking Bariatric Surgery Eval? Yes Her friend has had bariatric surgery   She attended her online seminar     Realizes [address] [employer] Requirements? Somewhat     Pre-morbid level of function and history of present illness: Within last 6 years pt reports she  started to gain weight  She is a  and and doesn't exercise  She reports  feeling like she is "starving" at night" and eats a lot when she gets home     Psychiatric/Psychological Treatment Diagnosis: Pt denies this     Outpatient Counselor No     Psychiatrist No     Have you had Inpatient Treatment? No    Family Constellation Pt lives with her 3 children ages 15, 12 and 21  No current significant other  Mother living at 61 and has HBP , diabetes and high cholesterol  Father  living at 68  She reports father may have prostate cancer  She is not in communication with him  She states she has 3 brothers  She reports she is not aware of any health issues for them  She denies any D & A issues as well as MH issues for family members listed above  Domestic Violence No     Are they currently in the situation? No    Abuse History:  Patient denies this    Additional comments/stressors related to family/relationships/peer support - Pt reports her support system to be comprised of her mother-in-law and brother  Current strssor centers around her weight      Physical/Psychological Assessment:     Appearance: appropriate  Sociability: average  Affect: appropriate  Mood: calm  Thought Process: coherent  Speech: normal  Content: no impairment  Orientation: person  Yes , place  Yes , time  Yes , normal attention span  Yes , normal memory  Yes  , decreased in concentration ability  No and normal judgement  Yes   Insight: intellectual  good    Risk Assessment: None    Risk of Harm to Self or Others: Pt denies any thoughts of hurting self or others currently as well as historically  She denies any hx of any suicide attempts  Access to weapons no     Weapons secured by N/A      Based on the previous information, the client presents the following risk of harm to self or others: low      BARIATRIC SURGERY EDUCATION CHECKLIST     I have received education related to my bariatric surgery process and understand:     Patients may be required to complete a psychiatric evaluation and receive clearance for surgery from their psychiatrist      Patients who undergo weight loss surgery are at higher risk of increased mental health concerns and suicide attempts      Patients may be required to complete a full substance abuse evaluation and then complete all treatment recommendations prior to surgery      If diagnosis of abuse/dependence results, patient may be required to remain sober for one (1) year before having bariatric surgery      Patients on psychiatric medications should check with their provider to discuss psychiatric medications and the changes in absorption    Patient should discuss all time release medications with provider and take all medications as prescribed      The recommendation is that there is no use of  any tobacco products, Hookah or  vapes for the bariatric post-operation patient      Bariatric surgery patients should not consume alcohol as a post-operative patient as it may increase risk of numerous health conditions including but not limited to alcohol abuse and ulcers      There is a possibility of weight regain if patient does not follow all program guidelines and recommendations      Bariatric surgery patients should exercise thirty (30) to sixty (60) minutes per day to maintain post-surgical weight loss      Research indicates that bariatric patients are more successful when they see a therapist for up to two (2) years post-op      Patients will follow all medical and dietary recommendations provided      Patient will keep all scheduled appointments and follow up with their physician for a minimum of five (5) years      Patient will take all vitamins as recommended  Post-operative vitamins are life-long      Patient reviewed Bariatric Surgery Education Checklist and agrees they have received education on these issues  Completed Behavioral Health Assessment  Provided patient education as needed  Patient denies symptoms of  Axis 1 diagnosis of depression/anxiety and denies currently taking medication prescribed by PCP or psychiatrist  Patient  meets criteria for [address] [place] [address] bariatric  surgery program and is therefore referred to surgeon  Bariatric Nutrition Follow Up    Type of surgery    Preop  Surgery Date: TBD  Surgeon: Undecided    Nutrition Assessment   Edgar Wood  41 y o   female     Wt with BMI of 25: 145 lbs  Pre-Op Excess Wt: 71 lbs  Height 5' 4" (1 626 m), weight 98 2 kg (216 lb 6 4 oz)  Body mass index is 37 14 kg/m²     [place] St Jeor Equation-2100 cals to maintain wt                                        1600 cals to lose 1 lb/week    Weight History   Onset of Obesity: Adult  Family history of obesity: Yes  Wt Loss Attempts: Exercise  FAD Diets (Cabbage soup, Grapefruit, Cleanse, etc )  High Protein/Low CHO diets (Atkins, Union, etc )  Self Created Diets (Portion Control, Healthy Food Choices, etc )  Maximum Wt Lost: 12 lbs    Review of History and Medications   Past Medical History:   Diagnosis Date    Hypertension     Obesity      Past Surgical History:   Procedure Laterality Date    ABDOMINAL SURGERY  2009    Tummy Tuck    DILATION AND CURETTAGE OF UTERUS      TUBAL LIGATION       Social History     Socioeconomic History    Marital status: Single     Spouse name: Not on file    Number of children: Not on file    Years of education: Not on file    Highest education level: Not on file   Occupational History    Not on file   Social Needs    Financial resource strain: Not on file    Food insecurity     Worry: Not on file     Inability: Not on file    Transportation needs     Medical: Not on file     Non-medical: Not on file   Tobacco Use    Smoking status: Former Smoker     Quit date:      Years since quittin 0    Smokeless tobacco: Never Used   Substance and Sexual Activity    Alcohol use: Yes     Comment: social     Drug use: No    Sexual activity: Not Currently   Lifestyle    Physical activity     Days per week: Not on file     Minutes per session: Not on file    Stress: Not on file   Relationships    Social connections     Talks on phone: Not on file     Gets together: Not on file     Attends Restorationist service: Not on file     Active member of club or organization: Not on file     Attends meetings of clubs or organizations: Not on file     Relationship status: Not on file    Intimate partner violence     Fear of current or ex partner: Not on file     Emotionally abused: Not on file     Physically abused: Not on file     Forced sexual activity: Not on file   Other Topics Concern    Not on file   Social History Narrative    Not on file       Current Outpatient Medications:     acyclovir (ZOVIRAX) 400 MG tablet, Take 2 tablets (800 mg total) by mouth 5 (five) times a day for 7 days (Patient not taking: Reported on 10/12/2020), Disp: 70 tablet, Rfl: 0    amLODIPine (NORVASC) 5 mg tablet, Take 5 mg by mouth daily, Disp: , Rfl:     losartan (COZAAR) 100 MG tablet, Take 100 mg by mouth daily, Disp: , Rfl:   Food Intake and Lifestyle Assessment   Food Intake Assessment completed via 24 hour recall  Breakfast: banana  Or fried eggs with salami and cheese and plantain on weekend  Snack: [place] yogurt  Lunch: 0  Snack: 0  Dinner: meat, rice, and beans or salad or vegetables  Snack: occasionally fruits  Beverage intake: water and sparkling water, regular soda, coffee/tea and alcohol  Protein supplement: 0  Estimated protein intake per day: >65g  Estimated fluid intake per day: 50 oz  Meals eaten away from home: 4-5 times/week  Typical meal pattern: 3 meals per day and 1-2 snacks per day  Eating Behaviors: Consumption of high calorie/ high fat foods, Consumption of high calorie beverages and Large portion sizes  Food allergies or intolerances: No Known Allergies  Cultural or Oriental orthodox considerations: Eunice    Physical Assessment  Physical Activity  Types of exercise: Started at gym 3 weeks ago-5 days/week cardio and weights  Current physical limitations: none    Psychosocial Assessment   Support systems: children  Socioeconomic factors: works as a     Nutrition Diagnosis  Diagnosis: Overweight / Obesity (NC-3 3)  Related to: Physical inactivity and Excessive energy intake  As Evidenced by: BMI >25     Nutrition Prescription: Recommend the following diet  Low fat, Low sugar and High protein    Interventions and Teaching   Discussed pre-op and post-op nutrition guidelines  Patient educated and handouts provided    Surgical changes to stomach / GI  Capacity of post-surgery stomach  Diet progression  Adequate hydration  Sugar and fat restriction to decrease "dumping syndrome"  Fat restriction to decrease steatorrhea  Expected weight loss  Weight loss plateaus/ possibility of weight regain  Exercise  Suggestions for pre-op diet  Nutrition considerations after surgery  Protein supplements  Meal planning and preparation  Appropriate carbohydrate, protein, and fat intake, and food/fluid choices to maximize safe weight loss, nutrient intake, and tolerance   Dietary and lifestyle changes  Possible problems with poor eating habits  Intuitive eating  Techniques for self monitoring and keeping daily food journal  Potential for food intolerance after surgery, and ways to deal with them including: lactose intolerance, nausea, reflux, vomiting, diarrhea, food intolerance, appetite changes, gas  Vitamin / Mineral supplementation of Multivitamin with minerals and Vitamin D    Education provided to: patient    Barriers to learning: No barriers identified  Readiness to change: preparation    Prior research on procedure: internet and discussed with provider    Comprehension: verbalizes understanding     Expected Compliance: good  Recommendations  Pt is an appropriate candidate for surgery   Yes    Evaluation / Monitoring  Dietitian to Monitor: Eating pattern as discussed Body weight Lab values Physical activity    Goals  Eliminate sugar sweetened beverages, Food journal, Exercise 30 minutes 5 times per week, Complete lession plans 1-6 and Eat 3 meals per day    Time Spent:   30 minutes

## 2021-02-18 ENCOUNTER — TELEMEDICINE (OUTPATIENT)
Dept: BARIATRICS | Facility: CLINIC | Age: 41
End: 2021-02-18
Payer: COMMERCIAL

## 2021-02-18 VITALS — WEIGHT: 213 LBS | BODY MASS INDEX: 37.74 KG/M2 | HEIGHT: 63 IN

## 2021-02-18 DIAGNOSIS — E66.9 OBESITY, CLASS II, BMI 35-39.9: Primary | ICD-10-CM

## 2021-02-18 DIAGNOSIS — I10 ESSENTIAL HYPERTENSION: ICD-10-CM

## 2021-02-18 PROCEDURE — 99214 OFFICE O/P EST MOD 30 MIN: CPT | Performed by: SURGERY

## 2021-02-18 NOTE — PROGRESS NOTES
Virtual Regular Visit      Assessment/Plan:    Problem List Items Addressed This Visit     None               Reason for visit is   Chief Complaint   Patient presents with    Consult    Virtual Regular Visit        Encounter provider Mckenna Marr MD    Provider located at [address] [place] [id number]      Recent Visits  No visits were found meeting these conditions  Showing recent visits within past 7 days and meeting all other requirements     Today's Visits  Date Type Provider Dept   02/18/21 Telemedicine Mckenna Marr MD [facility]   Showing today's visits and meeting all other requirements     Future Appointments  No visits were found meeting these conditions  Showing future appointments within next 150 days and meeting all other requirements        The patient was identified by name and date of birth  Pietro Reed was informed that this is a telemedicine visit and that the visit is being conducted through [employer] and patient was informed that this is a secure, HIPAA-compliant platform  She agrees to proceed     My office door was closed  No one else was in the room  She acknowledged consent and understanding of privacy and security of the video platform  The patient has agreed to participate and understands they can discontinue the visit at any time  Patient is aware this is a billable service  Anuradha Rodriguez is a 41 y o  female Patient has a long history of morbid obesity and is presenting to discuss the surgical weight loss options  Despite the patient best efforts patient was unable to lose any meaningful or sustainable weight using nonsurgical means  We had a long discussion regarding all the surgical weight-loss options at our disposal at this point and reviewed the risks and benefits of each procedure in details as it relates to her age, BMI and medical conditions      Patient elected to undergo Sleeve gastrectomy    Risks and benefits were explained to the patient  We also discussed the importance and need of a preoperative workup to make sure that the patient can undergo the procedure safely  Preoperative workup includes sleep apnea screening, cardiac evaluation, nutrition/psych and preoperative EGD  Risks and benefits of all the preoperative diagnostic tests were discussed with the patient including but not limited to the upper endoscopy  Alternatives to surgery and alternative forms of surgery were also explained  Postsurgical commitment and aftercare programs were discussed and explained to the patient in details   In terms of comorbidities patient suffers mostly of HTN           HPI     Past Medical History:   Diagnosis Date    Hypertension     Obesity        Past Surgical History:   Procedure Laterality Date    ABDOMINAL SURGERY  2009    Tummy Tuck    DILATION AND CURETTAGE OF UTERUS      TUBAL LIGATION         Current Outpatient Medications   Medication Sig Dispense Refill    amLODIPine (NORVASC) 5 mg tablet Take 5 mg by mouth daily      losartan (COZAAR) 100 MG tablet Take 100 mg by mouth daily      acyclovir (ZOVIRAX) 400 MG tablet Take 2 tablets (800 mg total) by mouth 5 (five) times a day for 7 days (Patient not taking: Reported on 10/12/2020) 70 tablet 0     No current facility-administered medications for this visit  No Known Allergies    Review of Systems    Video Exam    Vitals:    02/18/21 0903   Weight: 96 6 kg (213 lb)   Height: 5' 3" (1 6 m)       Physical Exam     I spent 20 minutes directly with the patient during this visit      VIRTUAL VISIT DISCLAIMER    Xavier Cuenca acknowledges that she has consented to an online visit or consultation   She understands that the online visit is based solely on information provided by her, and that, in the absence of a face-to-face physical evaluation by the physician, the diagnosis she receives is both limited and provisional in terms of accuracy and completeness  This is not intended to replace a full medical face-to-face evaluation by the physician  Venu Pathak understands and accepts these terms

## 2021-03-02 ENCOUNTER — HOSPITAL ENCOUNTER (OUTPATIENT)
Dept: SLEEP CENTER | Facility: CLINIC | Age: 41
Discharge: HOME | End: 2021-03-02
Payer: COMMERCIAL

## 2021-03-02 DIAGNOSIS — R06.83 SNORING: ICD-10-CM

## 2021-03-02 DIAGNOSIS — I10 ESSENTIAL HYPERTENSION: ICD-10-CM

## 2021-03-02 DIAGNOSIS — E66.09 CLASS 2 OBESITY DUE TO EXCESS CALORIES WITHOUT SERIOUS COMORBIDITY WITH BODY MASS INDEX (BMI) OF 37.0 TO 37.9 IN ADULT: ICD-10-CM

## 2021-03-02 PROCEDURE — 95810 POLYSOM 6/> YRS 4/> PARAM: CPT

## 2021-03-03 ENCOUNTER — TELEPHONE (OUTPATIENT)
Dept: SLEEP CENTER | Facility: CLINIC | Age: 41
End: 2021-03-03

## 2021-03-03 NOTE — PROGRESS NOTES
Sleep Study Documentation    Pre-Sleep Study       Sleep testing procedure explained to patient:YES    Patient napped prior to study:NO    Caffeine:Dayshift worker after 12PM   Caffeine use:YES- soda  12 to 26 ounces    Alcohol:Dayshift workers after 5PM: Alcohol use:NO    Typical day for patient:YES       Study Documentation    Sleep Study Indications: BMI, SNORING    Sleep Study: Diagnostic   Snore:Severe  Supplemental O2: no      Minimum SaO2 81%  Baseline SaO2 97%          EKG abnormalities: no     EEG abnormalities: no    Sleep Study Recorded < 2 hours: N/A    Sleep Study Recorded > 2 hours but incomplete study: N/A    Sleep Study Recorded 6 hours but no sleep obtained: NO    Patient classification: employed       Post-Sleep Study    Medication used at bedtime or during sleep study:NO    Patient reports time it took to fall asleep:20 to 30 minutes    Patient reports waking up during study:1 to 2 times  Patient reports returning to sleep without difficulty  Patient reports sleeping 4 to 6 hours without dreaming  Patient reports sleep during study:typical    Patient rated sleepiness: Not sleepy or tired    PAP treatment:no

## 2021-03-03 NOTE — TELEPHONE ENCOUNTER
Left message for patient to call office    Sleep study reveals mild MARSHALL, will need follow up with Dr Josy Madsen to discuss treatment options

## 2021-03-08 DIAGNOSIS — G47.33 OSA (OBSTRUCTIVE SLEEP APNEA): ICD-10-CM

## 2021-03-08 DIAGNOSIS — E66.9 OBESITY, CLASS II, BMI 35-39.9: Primary | ICD-10-CM

## 2021-03-08 DIAGNOSIS — Z01.810 PREOP CARDIOVASCULAR EXAM: ICD-10-CM

## 2021-03-08 DIAGNOSIS — I10 ESSENTIAL HYPERTENSION: ICD-10-CM

## 2021-03-11 ENCOUNTER — OFFICE VISIT (OUTPATIENT)
Dept: BARIATRICS | Facility: CLINIC | Age: 41
End: 2021-03-11

## 2021-03-11 VITALS — WEIGHT: 214.2 LBS | BODY MASS INDEX: 37.94 KG/M2

## 2021-03-11 DIAGNOSIS — E66.9 OBESITY, CLASS I, BMI 30-34.9: Primary | ICD-10-CM

## 2021-03-11 PROCEDURE — RECHECK: Performed by: SOCIAL WORKER

## 2021-03-11 NOTE — PROGRESS NOTES
This appointment is a weight check   Cj Tejada  is a 41 y o    female    :  1980   Patient presented for behavioral health follow up for the bariatric program   We reviewed the workflow she has her Cardia appt on 4/3 and the EGD on   Last time patient was coping with wt  Loss and not eating after 6:30 pm   Patient has been working on behavior changes to prepare for surgery  Currently patient is having success with exercising 3x/wk at the gym where she does weights and core training  Patient is not struggling with anything at this time  Discussion around next steps before surgery  Plan for positive outcomes includes scheduling surgery  Patient is making progress toward surgery and next appointment is scheduled for 4/15 with TIFFANY Barr, MSW, LCSW  _____________________________

## 2021-03-30 ENCOUNTER — PREP FOR PROCEDURE (OUTPATIENT)
Dept: BARIATRICS | Facility: CLINIC | Age: 41
End: 2021-03-30

## 2021-03-30 DIAGNOSIS — E66.9 OBESITY, CLASS II, BMI 35-39.9: Primary | ICD-10-CM

## 2021-04-02 ENCOUNTER — CONSULT (OUTPATIENT)
Dept: CARDIOLOGY CLINIC | Facility: CLINIC | Age: 41
End: 2021-04-02
Payer: COMMERCIAL

## 2021-04-02 VITALS
BODY MASS INDEX: 37.22 KG/M2 | HEIGHT: 64 IN | SYSTOLIC BLOOD PRESSURE: 124 MMHG | HEART RATE: 64 BPM | DIASTOLIC BLOOD PRESSURE: 68 MMHG | WEIGHT: 218 LBS

## 2021-04-02 DIAGNOSIS — I10 ESSENTIAL HYPERTENSION: ICD-10-CM

## 2021-04-02 DIAGNOSIS — Z01.810 PREOP CARDIOVASCULAR EXAM: ICD-10-CM

## 2021-04-02 DIAGNOSIS — E66.9 OBESITY, CLASS II, BMI 35-39.9: ICD-10-CM

## 2021-04-02 PROCEDURE — 99243 OFF/OP CNSLTJ NEW/EST LOW 30: CPT | Performed by: INTERNAL MEDICINE

## 2021-04-03 PROBLEM — Z01.810 PREOP CARDIOVASCULAR EXAM: Status: ACTIVE | Noted: 2021-04-03

## 2021-04-03 PROCEDURE — 93000 ELECTROCARDIOGRAM COMPLETE: CPT | Performed by: INTERNAL MEDICINE

## 2021-04-03 NOTE — PROGRESS NOTES
Consultation - Cardiology   Dedrick Giraldo 41 y o  female MRN: [id number]  Unit/Bed#:  Encounter: [id number]  Physician Requesting Consult: No att  providers found  Reason for Consult / Principal Problem: Pre op cardiac evaluation for bariatric surgery    Assessment:  1  HTN  2  Obesity      Plan:   She is active and has no cardiac symptoms  ECG in unremarkable  She has HTN which is well controlled  I feel that her cardiac risk for bariatric surgery is low and she is cleared without further cardiac testing needed  History of Present Illness     HPI: Dedrick Giraldo is a 41y o  year old female who denies any past cardiac history  She has controlled HTN  She denies DM, hypercholesterolemia, FH of CAD, smoking  She is active and exercises on the treadmill regularly without CP or significant SOB  She does a lot of walking at work and goes up and down 2-3 flights of stairs without a problem  /68  ECG - NSR  Non specific T wave abnl        Review of Systems:    Alert awake oriented, comfortable, denies any complaints  No fevers chills nausea vomiting  No weakness, dizziness, seizures  no cough, shortness of breath, or wheezing  Denies any palpitations, chest pain, diaphoresis  Denies leg edema, pain or paresthesias  Denies any skin rashes  Denies abdominal pain, bloody stools, masses  Denies any depression or suicidal ideations      Historical Information   Past Medical History:   Diagnosis Date    Hypertension     Obesity      Past Surgical History:   Procedure Laterality Date    ABDOMINAL SURGERY      Tummy Tuck    DILATION AND CURETTAGE OF UTERUS      TUBAL LIGATION       Social History     Substance and Sexual Activity   Alcohol Use Yes    Comment: social      Social History     Substance and Sexual Activity   Drug Use No     Social History     Tobacco Use   Smoking Status Former Smoker    Quit date:     Years since quittin 2   Smokeless Tobacco Never Used     Family History: non-contributory    Meds/Allergies   all current active meds have been reviewed  No Known Allergies    Objective   Vitals: Blood pressure 124/68, pulse 64, height 5' 3 5" (1 613 m), weight 98 9 kg (218 lb)  , Body mass index is 38 01 kg/m²  ,   Weight (last 2 days)     Date/Time   Weight    04/02/21 1637   98 9 (218)                        Physical Exam:  GEN: Remus Aleksandr appears well, alert and oriented x 3, pleasant and cooperative   HEENT: pupils equal, round, and reactive to light; extraocular muscles intact  NECK: supple, no carotid bruits   HEART: regular rhythm, normal S1 and S2, no murmurs, clicks, gallops or rubs   LUNGS: clear to auscultation bilaterally; no wheezes, rales, or rhonchi   ABDOMEN: normal bowel sounds, soft, no tenderness, no distention  EXTREMITIES: peripheral pulses normal; no clubbing, cyanosis, or edema  NEURO: no focal findings   SKIN: normal without suspicious lesions on exposed skin    Lab Results:   Consult on 04/02/2021   Component Date Value Ref Range Status    Interpretation 04/03/2021    Final    NSR  LAD  Non specific T wave abnl  Invalid input(s): LABALBU          Imaging: I have personally reviewed pertinent reports

## 2021-04-05 ENCOUNTER — TELEPHONE (OUTPATIENT)
Dept: BARIATRICS | Facility: CLINIC | Age: 41
End: 2021-04-05

## 2021-04-05 NOTE — TELEPHONE ENCOUNTER
I left a detailed message advising that she has not completed the sleep medicine work-up and is required to have documentation of being cleared for surgery, with or without C-PAP  I asked her to give me a call back to further explain

## 2021-04-06 DIAGNOSIS — Z87.891 FORMER CIGARETTE SMOKER: ICD-10-CM

## 2021-04-06 DIAGNOSIS — E66.9 OBESITY, CLASS II, BMI 35-39.9: Primary | ICD-10-CM

## 2021-04-06 DIAGNOSIS — Z01.818 PREOP TESTING: ICD-10-CM

## 2021-04-12 ENCOUNTER — ANESTHESIA EVENT (OUTPATIENT)
Dept: GASTROENTEROLOGY | Facility: HOSPITAL | Age: 41
End: 2021-04-12

## 2021-04-14 ENCOUNTER — HOSPITAL ENCOUNTER (OUTPATIENT)
Dept: GASTROENTEROLOGY | Facility: HOSPITAL | Age: 41
Setting detail: OUTPATIENT SURGERY
Discharge: HOME | End: 2021-04-14
Attending: SURGERY
Payer: COMMERCIAL

## 2021-04-14 ENCOUNTER — ANESTHESIA (OUTPATIENT)
Dept: GASTROENTEROLOGY | Facility: HOSPITAL | Age: 41
End: 2021-04-14

## 2021-04-14 VITALS
TEMPERATURE: 97.5 F | SYSTOLIC BLOOD PRESSURE: 164 MMHG | HEART RATE: 63 BPM | OXYGEN SATURATION: 99 % | RESPIRATION RATE: 20 BRPM | WEIGHT: 215 LBS | DIASTOLIC BLOOD PRESSURE: 94 MMHG | HEIGHT: 64 IN | BODY MASS INDEX: 36.7 KG/M2

## 2021-04-14 DIAGNOSIS — E66.9 OBESITY, CLASS II, BMI 35-39.9: ICD-10-CM

## 2021-04-14 LAB
EXT PREGNANCY TEST URINE: NEGATIVE
EXT. CONTROL: NORMAL

## 2021-04-14 PROCEDURE — 88305 TISSUE EXAM BY PATHOLOGIST: CPT | Performed by: PATHOLOGY

## 2021-04-14 PROCEDURE — 43239 EGD BIOPSY SINGLE/MULTIPLE: CPT | Performed by: SURGERY

## 2021-04-14 PROCEDURE — 88342 IMHCHEM/IMCYTCHM 1ST ANTB: CPT | Performed by: PATHOLOGY

## 2021-04-14 PROCEDURE — 81025 URINE PREGNANCY TEST: CPT | Performed by: ANESTHESIOLOGY

## 2021-04-14 RX ORDER — SODIUM CHLORIDE 9 MG/ML
INJECTION, SOLUTION INTRAVENOUS CONTINUOUS PRN
Status: DISCONTINUED | OUTPATIENT
Start: 2021-04-14 | End: 2021-04-14

## 2021-04-14 RX ORDER — PROPOFOL 10 MG/ML
INJECTION, EMULSION INTRAVENOUS AS NEEDED
Status: DISCONTINUED | OUTPATIENT
Start: 2021-04-14 | End: 2021-04-14

## 2021-04-14 RX ORDER — LIDOCAINE HYDROCHLORIDE 20 MG/ML
INJECTION, SOLUTION EPIDURAL; INFILTRATION; INTRACAUDAL; PERINEURAL AS NEEDED
Status: DISCONTINUED | OUTPATIENT
Start: 2021-04-14 | End: 2021-04-14

## 2021-04-14 RX ORDER — SODIUM CHLORIDE 9 MG/ML
125 INJECTION, SOLUTION INTRAVENOUS CONTINUOUS
Status: DISCONTINUED | OUTPATIENT
Start: 2021-04-14 | End: 2021-04-18 | Stop reason: HOSPADM

## 2021-04-14 RX ADMIN — PROPOFOL 150 MG: 10 INJECTION, EMULSION INTRAVENOUS at 10:20

## 2021-04-14 RX ADMIN — SODIUM CHLORIDE 125 ML/HR: 0.9 INJECTION, SOLUTION INTRAVENOUS at 09:26

## 2021-04-14 RX ADMIN — LIDOCAINE HYDROCHLORIDE 100 MG: 20 INJECTION, SOLUTION EPIDURAL; INFILTRATION; INTRACAUDAL; PERINEURAL at 10:20

## 2021-04-14 RX ADMIN — SODIUM CHLORIDE: 0.9 INJECTION, SOLUTION INTRAVENOUS at 10:18

## 2021-04-14 NOTE — ANESTHESIA PREPROCEDURE EVALUATION
Procedure:  EGD    Relevant Problems   ANESTHESIA (within normal limits)      CARDIO   (+) HTN (hypertension)      ENDO (within normal limits)      GI/HEPATIC (within normal limits)      PULMONARY   (+) MARSHALL (obstructive sleep apnea)        Physical Exam    Airway    Mallampati score: I  TM Distance: >3 FB  Neck ROM: full     Dental   No notable dental hx     Cardiovascular  Cardiovascular exam normal    Pulmonary  Pulmonary exam normal     Other Findings        Anesthesia Plan  ASA Score- 3     Anesthesia Type- IV sedation with anesthesia with ASA Monitors  Additional Monitors:   Airway Plan:           Plan Factors-    Chart reviewed  Induction- intravenous  Postoperative Plan-     Informed Consent- Anesthetic plan and risks discussed with patient

## 2021-04-14 NOTE — H&P
H&P EXAM - Outpatient Endoscopy  [facility]o [address] [place] [address] GI LAB INTRA   Netta Sierra 41 y o  female MRN: [id number]  Unit/Bed#:  Encounter: [id number]        Impression: Morbid obesity    Plan:Upper endoscopy and a biopsy to rule out H  Pylori    Chief Complaint: Morbid obesity and preoperative endoscopy    Physical Exam: Normal not in acute distress   Chest: Clear to auscultation   Heart: Normal S1 and S2

## 2021-04-15 ENCOUNTER — OFFICE VISIT (OUTPATIENT)
Dept: BARIATRICS | Facility: CLINIC | Age: 41
End: 2021-04-15

## 2021-04-15 VITALS — BODY MASS INDEX: 37.22 KG/M2 | HEIGHT: 64 IN | WEIGHT: 218 LBS

## 2021-04-15 DIAGNOSIS — E66.9 OBESITY, CLASS II, BMI 35-39.9: Primary | ICD-10-CM

## 2021-04-15 PROCEDURE — RECHECK

## 2021-04-15 NOTE — PROGRESS NOTES
Bariatric Behavioral Health Evaluation    Presenting Problem- Pt wants surgery to improve her  health and hopes to lower her blood pressure  She reports also feeling fatigued easily  Is the patient seeking Bariatric Surgery Eval? Yes Her friend has had bariatric surgery   She attended her online seminar     Realizes [address] Requirements? Somewhat     Pre-morbid level of function and history of present illness: Within last 6 years pt reports she  started to gain weight  She is a  and and doesn't exercise  She reports  feeling like she is "starving" at night" and eats a lot when she gets home     Psychiatric/Psychological Treatment Diagnosis: Pt denies this     Outpatient Counselor No     Psychiatrist No     Have you had Inpatient Treatment? No    Family Constellation Pt lives with her 3 children ages 15, 12 and 21  No current significant other  Mother living at 61 and has HBP , diabetes and high cholesterol  Father  living at 68  She reports father may have prostate cancer  She is not in communication with him  She states she has 3 brothers  She reports she is not aware of any health issues for them  She denies any D & A issues as well as MH issues for family members listed above  Domestic Violence No     Are they currently in the situation? No    Abuse History:  Patient denies this    Additional comments/stressors related to family/relationships/peer support - Pt reports her support system to be comprised of her mother-in-law and brother  Current strssor centers around her weight      Physical/Psychological Assessment:     Appearance: appropriate  Sociability: average  Affect: appropriate  Mood: calm  Thought Process: coherent  Speech: normal  Content: no impairment  Orientation: person  Yes , place  Yes , time  Yes , normal attention span  Yes , normal memory  Yes  , decreased in concentration ability  No and normal judgement  Yes   Insight: intellectual  good    Risk Assessment: None    Risk of Harm to Self or Others: Pt denies any thoughts of hurting self or others currently as well as historically  She denies any hx of any suicide attempts  Access to weapons no     Weapons secured by N/A      Based on the previous information, the client presents the following risk of harm to self or others: low      BARIATRIC SURGERY EDUCATION CHECKLIST     I have received education related to my bariatric surgery process and understand:     Patients may be required to complete a psychiatric evaluation and receive clearance for surgery from their psychiatrist      Patients who undergo weight loss surgery are at higher risk of increased mental health concerns and suicide attempts      Patients may be required to complete a full substance abuse evaluation and then complete all treatment recommendations prior to surgery      If diagnosis of abuse/dependence results, patient may be required to remain sober for one (1) year before having bariatric surgery      Patients on psychiatric medications should check with their provider to discuss psychiatric medications and the changes in absorption    Patient should discuss all time release medications with provider and take all medications as prescribed      The recommendation is that there is no use of  any tobacco products, Hookah or  vapes for the bariatric post-operation patient      Bariatric surgery patients should not consume alcohol as a post-operative patient as it may increase risk of numerous health conditions including but not limited to alcohol abuse and ulcers      There is a possibility of weight regain if patient does not follow all program guidelines and recommendations      Bariatric surgery patients should exercise thirty (30) to sixty (60) minutes per day to maintain post-surgical weight loss      Research indicates that bariatric patients are more successful when they see a therapist for up to two (2) years post-op      Patients will follow all medical and dietary recommendations provided      Patient will keep all scheduled appointments and follow up with their physician for a minimum of five (5) years      Patient will take all vitamins as recommended  Post-operative vitamins are life-long      Patient reviewed Bariatric Surgery Education Checklist and agrees they have received education on these issues  Completed Behavioral Health Assessment  Provided patient education as needed  Patient denies symptoms of  Axis 1 diagnosis of depression/anxiety and denies currently taking medication prescribed by PCP or psychiatrist  Patient  meets criteria for [address] [place] [address] bariatric  surgery program and is therefore referred to surgeon  Bariatric Nutrition Follow Up    Type of surgery    Preop  Surgery Date: TBD  Surgeon: Undecided    Nutrition Assessment   Maximo Lopes  41 y o   female     Wt with BMI of 25: 145 lbs  Pre-Op Excess Wt: 71 lbs  Height 5' 4" (1 626 m), weight 98 9 kg (218 lb), last menstrual period 03/31/2021  Body mass index is 37 42 kg/m²     [place] Jeor Equation-2100 cals to maintain wt                                        1600 cals to lose 1 lb/week    Weight History   Onset of Obesity: Adult  Family history of obesity: Yes  Wt Loss Attempts: Exercise  FAD Diets (Cabbage soup, Grapefruit, Cleanse, etc )  High Protein/Low CHO diets (Atkins, Union, etc )  Self Created Diets (Portion Control, Healthy Food Choices, etc )  Maximum Wt Lost: 12 lbs    Review of History and Medications   Past Medical History:   Diagnosis Date    Hypertension     Obesity      Past Surgical History:   Procedure Laterality Date    ABDOMINAL SURGERY  2009    Tummy Tuck    DILATION AND CURETTAGE OF UTERUS      TUBAL LIGATION       Social History     Socioeconomic History    Marital status: Single     Spouse name: Not on file    Number of children: Not on file    Years of education: Not on file    Highest education level: Not on file   Occupational History    Not on file   Social Needs    Financial resource strain: Not on file    Food insecurity     Worry: Not on file     Inability: Not on file    Transportation needs     Medical: Not on file     Non-medical: Not on file   Tobacco Use    Smoking status: Former Smoker     Quit date:      Years since quittin 2    Smokeless tobacco: Never Used   Substance and Sexual Activity    Alcohol use: Yes     Comment: social     Drug use: No    Sexual activity: Not Currently   Lifestyle    Physical activity     Days per week: Not on file     Minutes per session: Not on file    Stress: Not on file   Relationships    Social connections     Talks on phone: Not on file     Gets together: Not on file     Attends [employer] service: Not on file     Active member of club or organization: Not on file     Attends meetings of clubs or organizations: Not on file     Relationship status: Not on file    Intimate partner violence     Fear of current or ex partner: Not on file     Emotionally abused: Not on file     Physically abused: Not on file     Forced sexual activity: Not on file   Other Topics Concern    Not on file   Social History Narrative    Not on file       Current Outpatient Medications:     amLODIPine (NORVASC) 5 mg tablet, Take 5 mg by mouth daily, Disp: , Rfl:     losartan (COZAAR) 100 MG tablet, Take 100 mg by mouth daily, Disp: , Rfl:   No current facility-administered medications for this visit       Facility-Administered Medications Ordered in Other Visits:     sodium chloride 0 9 % infusion, 125 mL/hr, Intravenous, Continuous, Tete Roberts DO, Stopped at 21 1052  Food Intake and Lifestyle Assessment   Food Intake Assessment completed via 24 hour recall  Breakfast: banana  Or fried eggs with salami and cheese and plantain on weekend  Snack: [place] yogurt  Lunch: 0  Snack: 0  Dinner: meat, rice, and beans or salad or vegetables  Snack: occasionally fruits  Beverage intake: water and sparkling water, regular soda, coffee/tea and alcohol  Protein supplement: 0  Estimated protein intake per day: >65g  Estimated fluid intake per day: 50 oz  Meals eaten away from home: 4-5 times/week  Typical meal pattern: 3 meals per day and 1-2 snacks per day  Eating Behaviors: Consumption of high calorie/ high fat foods, Consumption of high calorie beverages and Large portion sizes  Food allergies or intolerances: No Known Allergies  Cultural or Anabaptism considerations: [place]    Physical Assessment  Physical Activity  Types of exercise: Started at gym 3 weeks ago-5 days/week cardio and weights  Current physical limitations: none    Psychosocial Assessment   Support systems: children  Socioeconomic factors: works as a     Nutrition Diagnosis  Diagnosis: Overweight / Obesity (NC-3 3)  Related to: Physical inactivity and Excessive energy intake  As Evidenced by: BMI >25     Nutrition Prescription: Recommend the following diet  Low fat, Low sugar and High protein    Interventions and Teaching   Discussed pre-op and post-op nutrition guidelines  Patient educated and handouts provided    Surgical changes to stomach / GI  Capacity of post-surgery stomach  Diet progression  Adequate hydration  Sugar and fat restriction to decrease "dumping syndrome"  Fat restriction to decrease steatorrhea  Expected weight loss  Weight loss plateaus/ possibility of weight regain  Exercise  Suggestions for pre-op diet  Nutrition considerations after surgery  Protein supplements  Meal planning and preparation  Appropriate carbohydrate, protein, and fat intake, and food/fluid choices to maximize safe weight loss, nutrient intake, and tolerance   Dietary and lifestyle changes  Possible problems with poor eating habits  Intuitive eating  Techniques for self monitoring and keeping daily food journal  Potential for food intolerance after surgery, and ways to deal with them including: lactose intolerance, nausea, reflux, vomiting, diarrhea, food intolerance, appetite changes, gas  Vitamin / Mineral supplementation of Multivitamin with minerals and Vitamin D    Education provided to: patient    Barriers to learning: No barriers identified  Readiness to change: preparation    Prior research on procedure: internet and discussed with provider    Comprehension: verbalizes understanding     Expected Compliance: good  Recommendations  Pt is an appropriate candidate for surgery  Yes    Evaluation / Monitoring  Dietitian to Monitor: Eating pattern as discussed Body weight Lab values Physical activity    Goals  Eliminate sugar sweetened beverages, Food journal, Exercise 30 minutes 5 times per week, Complete lession plans 1-6 and Eat 3 meals per day   Pt needs to have bloodwork drawn      Time Spent:   30 minutes

## 2021-04-26 DIAGNOSIS — A04.8 POSITIVE H. PYLORI TEST: Primary | ICD-10-CM

## 2021-04-26 RX ORDER — OMEPRAZOLE 20 MG/1
20 CAPSULE, DELAYED RELEASE ORAL 2 TIMES DAILY
Qty: 28 CAPSULE | Refills: 0 | Status: SHIPPED | OUTPATIENT
Start: 2021-04-26 | End: 2021-05-14

## 2021-04-26 RX ORDER — CLARITHROMYCIN 500 MG/1
500 TABLET, COATED ORAL EVERY 12 HOURS SCHEDULED
Qty: 28 TABLET | Refills: 0 | Status: SHIPPED | OUTPATIENT
Start: 2021-04-26 | End: 2021-05-10

## 2021-04-26 RX ORDER — AMOXICILLIN 500 MG/1
1000 CAPSULE ORAL 2 TIMES DAILY
Qty: 56 CAPSULE | Refills: 0 | Status: SHIPPED | OUTPATIENT
Start: 2021-04-26 | End: 2021-05-10

## 2021-04-26 NOTE — TELEPHONE ENCOUNTER
----- Message from Lenka Bond MD sent at 4/26/2021 11:17 AM EDT -----  Please prescribe Prevpack for two weeks

## 2021-05-07 ENCOUNTER — PREP FOR PROCEDURE (OUTPATIENT)
Dept: BARIATRICS | Facility: CLINIC | Age: 41
End: 2021-05-07

## 2021-05-07 DIAGNOSIS — E66.01 SEVERE OBESITY (BMI 35.0-39.9) WITH COMORBIDITY (HCC): Primary | ICD-10-CM

## 2021-05-07 DIAGNOSIS — R06.83 SNORING: ICD-10-CM

## 2021-05-07 DIAGNOSIS — I10 BENIGN HYPERTENSION: ICD-10-CM

## 2021-05-11 DIAGNOSIS — R06.83 SNORING: ICD-10-CM

## 2021-05-11 DIAGNOSIS — I10 BENIGN HYPERTENSION: ICD-10-CM

## 2021-05-11 DIAGNOSIS — E66.01 SEVERE OBESITY (BMI 35.0-39.9) WITH COMORBIDITY (HCC): ICD-10-CM

## 2021-05-11 PROCEDURE — U0003 INFECTIOUS AGENT DETECTION BY NUCLEIC ACID (DNA OR RNA); SEVERE ACUTE RESPIRATORY SYNDROME CORONAVIRUS 2 (SARS-COV-2) (CORONAVIRUS DISEASE [COVID-19]), AMPLIFIED PROBE TECHNIQUE, MAKING USE OF HIGH THROUGHPUT TECHNOLOGIES AS DESCRIBED BY CMS-2020-01-R: HCPCS

## 2021-05-11 PROCEDURE — U0005 INFEC AGEN DETEC AMPLI PROBE: HCPCS

## 2021-05-12 LAB — SARS-COV-2 RNA RESP QL NAA+PROBE: NEGATIVE

## 2021-05-13 ENCOUNTER — TELEPHONE (OUTPATIENT)
Dept: BARIATRICS | Facility: CLINIC | Age: 41
End: 2021-05-13

## 2021-05-13 ENCOUNTER — CLINICAL SUPPORT (OUTPATIENT)
Dept: BARIATRICS | Facility: CLINIC | Age: 41
End: 2021-05-13

## 2021-05-13 ENCOUNTER — OFFICE VISIT (OUTPATIENT)
Dept: BARIATRICS | Facility: CLINIC | Age: 41
End: 2021-05-13
Payer: COMMERCIAL

## 2021-05-13 VITALS
TEMPERATURE: 97.4 F | BODY MASS INDEX: 37.65 KG/M2 | HEIGHT: 64 IN | RESPIRATION RATE: 20 BRPM | DIASTOLIC BLOOD PRESSURE: 88 MMHG | SYSTOLIC BLOOD PRESSURE: 140 MMHG | HEART RATE: 61 BPM | WEIGHT: 220.5 LBS

## 2021-05-13 DIAGNOSIS — E66.01 MORBID (SEVERE) OBESITY DUE TO EXCESS CALORIES (HCC): Primary | ICD-10-CM

## 2021-05-13 DIAGNOSIS — E66.9 OBESITY, CLASS II, BMI 35-39.9: Primary | ICD-10-CM

## 2021-05-13 PROCEDURE — RECHECK: Performed by: DIETITIAN, REGISTERED

## 2021-05-13 PROCEDURE — 99213 OFFICE O/P EST LOW 20 MIN: CPT | Performed by: SURGERY

## 2021-05-13 NOTE — PROGRESS NOTES
BARIATRIC HISTORY AND PHYSICAL - BARIATRIC SURGERY  Shyam Toledo 41 y o  female MRN: [id number]  Unit/Bed#:  Encounter: [id number]      HPI:  Shyam Toledo is a 41 y o  female who presents to review her preoperative workup and see if she is a good candidate to undergo a bariatric procedure    Review of Systems   Constitutional: Negative for activity change, appetite change, chills and fever  Eyes: Negative  Respiratory: Negative for apnea, cough, choking and shortness of breath  Cardiovascular: Negative for chest pain and palpitations  Gastrointestinal: Negative for abdominal distention, abdominal pain, nausea and vomiting  Genitourinary: Negative for difficulty urinating, dysuria and frequency  Neurological: Negative  Negative for dizziness, seizures and syncope         Historical Information   Past Medical History:   Diagnosis Date    Hypertension     Obesity      Past Surgical History:   Procedure Laterality Date    ABDOMINAL SURGERY  2009    Tummy Tuck    DILATION AND CURETTAGE OF UTERUS      TUBAL LIGATION       Social History   Social History     Substance and Sexual Activity   Alcohol Use Yes    Comment: social      Social History     Substance and Sexual Activity   Drug Use No     Social History     Tobacco Use   Smoking Status Former Smoker    Quit date:     Years since quittin 3   Smokeless Tobacco Never Used     Family History: non-contributory    Meds/Allergies   all medications and allergies reviewed  No Known Allergies    Objective     Current Vitals:   Blood Pressure: 140/88 (21 1407)  Pulse: 61 (21 1407)  Temperature: (!) 97 4 °F (36 3 °C) (21 1407)  Temp Source: Tympanic (21 1407)  Respirations: 20 (21 1407)  Height: 5' 4" (162 6 cm) (21 1407)  Weight - Scale: 100 kg (220 lb 8 oz) (21 140)  bowel movement  [unfilled]    Invasive Devices     None                 Physical Exam  Constitutional:       Appearance: Normal appearance  She is obese  HENT:      Head: Normocephalic and atraumatic  Neck:      Musculoskeletal: Normal range of motion  Cardiovascular:      Rate and Rhythm: Normal rate and regular rhythm  Heart sounds: Normal heart sounds  Pulmonary:      Effort: Pulmonary effort is normal       Breath sounds: Normal breath sounds  Abdominal:      General: Bowel sounds are normal  There is no distension  Palpations: Abdomen is soft  Tenderness: There is no abdominal tenderness  There is no guarding or rebound  Skin:     General: Skin is warm  Neurological:      Mental Status: She is alert and oriented to person, place, and time  Lab Results: I have personally reviewed pertinent lab results  Imaging: I have personally reviewed pertinent reports  EKG, Pathology, and Other Studies: I have personally reviewed pertinent reports  Code Status: [unfilled]  Advance Directive and Living Will:      Power of :    POLST:      Assessment/Plan:      The patient presented to review the preoperative workup and see if bariatric surgery is appropriate and indicated following the extensive preoperative workup and the enrollment in our weight loss program    Preoperative workup was complete  Results were reviewed with the patient including the blood work results and the endoscopy findings and the biopsy results  We also reviewed the cardiology evaluation  I believe that the patient will be a good candidate for laparoscopic sleeve gastrectomy  Patient will need to start the 2 week liquid diet prior to surgery  Risks and benefits explained one more time to the patient  Alternatives to surgery and alternative forms of surgery were also explained  Post-surgical commitment and after care programs were explained  We also discussed that the [employer] robot may be available to us to use on the day of the patient procedure  and that the procedure may be performed robotically    I discussed in details the advantages and disadvantages of this approach including the potential decrease in postoperative pain because of the remote center technology  I also mentioned the lack of strong evidence for  the use of robot in bariatric patients and the potential disadvantage to patients because of the prolonged operative time  Consent was signed  Questions were answered and concerns were addressed  Patient wishes to proceed  As per  [facility] guidelines, I had a discussion with the patient regarding his CODE STATUS in the perioperative period and the patient is level 1 or FULL CODE STATUS

## 2021-05-13 NOTE — PROGRESS NOTES
Weight Management Nutrition Class       Bariatric Surgeon: Dr Afshin Mcghee    Surgery: pre op sleeve     Class: Pre Op Class     Topics discussed today include:     Pt attended VIRTUAL pre-op education session  Standardized packet of information for bariatric surgery was sent via email and was reviewed with pt  Importance of lifestyle change and development of regular exercise routine stressed  Pt given the opportunity to ask questions  Ensure pre-surgery drink instructions were given  Questions were answered  Pt verbalized understanding of all information provided  Pt appeared prepared for upcoming surgery  Pt  educated on two-week pre operative liver shrinking diet  Pt understands that the diet needs to be followed for 2 weeks prior to surgery  Handout reviewed  Emphasized the need to drink 80 ounces of fluid per day while on the diet  Reviewed pre-op ERAS drink, post-operative clear liquid, full liquid, and pureed diet, post-operative nutrition rules and facts, and post-operative bariatric multivitamin/mineral recommendations and brand comparison  Contact information provided for any questions/concerns  Patient was able to verbalize basic diet (protein, fluid, vitamin and mineral) recommendations and possible nutrition-related complications   Yes

## 2021-05-13 NOTE — H&P (VIEW-ONLY)
BARIATRIC HISTORY AND PHYSICAL - BARIATRIC SURGERY  Christian Morales 41 y o  female MRN: [id number]  Unit/Bed#:  Encounter: [id number]      HPI:  Christian Morales is a 41 y o  female who presents to review her preoperative workup and see if she is a good candidate to undergo a bariatric procedure    Review of Systems   Constitutional: Negative for activity change, appetite change, chills and fever  Eyes: Negative  Respiratory: Negative for apnea, cough, choking and shortness of breath  Cardiovascular: Negative for chest pain and palpitations  Gastrointestinal: Negative for abdominal distention, abdominal pain, nausea and vomiting  Genitourinary: Negative for difficulty urinating, dysuria and frequency  Neurological: Negative  Negative for dizziness, seizures and syncope         Historical Information   Past Medical History:   Diagnosis Date    Hypertension     Obesity      Past Surgical History:   Procedure Laterality Date    ABDOMINAL SURGERY  2009    Tummy Tuck    DILATION AND CURETTAGE OF UTERUS      TUBAL LIGATION       Social History   Social History     Substance and Sexual Activity   Alcohol Use Yes    Comment: social      Social History     Substance and Sexual Activity   Drug Use No     Social History     Tobacco Use   Smoking Status Former Smoker    Quit date:     Years since quittin 3   Smokeless Tobacco Never Used     Family History: non-contributory    Meds/Allergies   all medications and allergies reviewed  No Known Allergies    Objective     Current Vitals:   Blood Pressure: 140/88 (21 1407)  Pulse: 61 (21 1407)  Temperature: (!) 97 4 °F (36 3 °C) (21 1407)  Temp Source: Tympanic (21 1407)  Respirations: 20 (21 1407)  Height: 5' 4" (162 6 cm) (21 1407)  Weight - Scale: 100 kg (220 lb 8 oz) (21 1407)  bowel movement  [unfilled]    Invasive Devices     None                 Physical Exam  Constitutional:       Appearance: Normal appearance  She is obese  HENT:      Head: Normocephalic and atraumatic  Neck:      Musculoskeletal: Normal range of motion  Cardiovascular:      Rate and Rhythm: Normal rate and regular rhythm  Heart sounds: Normal heart sounds  Pulmonary:      Effort: Pulmonary effort is normal       Breath sounds: Normal breath sounds  Abdominal:      General: Bowel sounds are normal  There is no distension  Palpations: Abdomen is soft  Tenderness: There is no abdominal tenderness  There is no guarding or rebound  Skin:     General: Skin is warm  Neurological:      Mental Status: She is alert and oriented to person, place, and time  Lab Results: I have personally reviewed pertinent lab results  Imaging: I have personally reviewed pertinent reports  EKG, Pathology, and Other Studies: I have personally reviewed pertinent reports  Code Status: [unfilled]  Advance Directive and Living Will:      Power of :    POLST:      Assessment/Plan:      The patient presented to review the preoperative workup and see if bariatric surgery is appropriate and indicated following the extensive preoperative workup and the enrollment in our weight loss program    Preoperative workup was complete  Results were reviewed with the patient including the blood work results and the endoscopy findings and the biopsy results  We also reviewed the cardiology evaluation  I believe that the patient will be a good candidate for laparoscopic sleeve gastrectomy  Patient will need to start the 2 week liquid diet prior to surgery  Risks and benefits explained one more time to the patient  Alternatives to surgery and alternative forms of surgery were also explained  Post-surgical commitment and after care programs were explained  We also discussed that the [employer] robot may be available to us to use on the day of the patient procedure  and that the procedure may be performed robotically    I discussed in details the advantages and disadvantages of this approach including the potential decrease in postoperative pain because of the remote center technology  I also mentioned the lack of strong evidence for  the use of robot in bariatric patients and the potential disadvantage to patients because of the prolonged operative time  Consent was signed  Questions were answered and concerns were addressed  Patient wishes to proceed  As per  [address] [place] [address] guidelines, I had a discussion with the patient regarding his CODE STATUS in the perioperative period and the patient is level 1 or FULL CODE STATUS

## 2021-05-14 DIAGNOSIS — E66.9 OBESITY, CLASS II, BMI 35-39.9: Primary | ICD-10-CM

## 2021-05-14 RX ORDER — AMOXICILLIN 500 MG/1
500 TABLET, FILM COATED ORAL 2 TIMES DAILY
COMMUNITY
End: 2021-05-19 | Stop reason: HOSPADM

## 2021-05-14 RX ORDER — OMEPRAZOLE 20 MG/1
20 CAPSULE, DELAYED RELEASE ORAL DAILY
Qty: 30 CAPSULE | Refills: 3 | Status: SHIPPED | OUTPATIENT
Start: 2021-05-14

## 2021-05-14 RX ORDER — LANSOPRAZOLE 30 MG/1
30 CAPSULE, DELAYED RELEASE ORAL DAILY
Status: ON HOLD | COMMUNITY
End: 2021-05-18 | Stop reason: ALTCHOICE

## 2021-05-14 RX ORDER — OXYCODONE HYDROCHLORIDE 5 MG/1
5 TABLET ORAL EVERY 4 HOURS PRN
Qty: 10 TABLET | Refills: 0 | Status: SHIPPED | OUTPATIENT
Start: 2021-05-18 | End: 2021-05-28 | Stop reason: ALTCHOICE

## 2021-05-14 NOTE — PRE-PROCEDURE INSTRUCTIONS
Pre-Surgery Instructions:   Medication Instructions    amLODIPine (NORVASC) 5 mg tablet Instructed patient per Anesthesia Guidelines  takes in pm    amoxicillin (AMOXIL) 500 MG tablet Patient was instructed by Physician and understands   clarithromycin (BIAXIN XL) 500 MG 24 hr tablet Patient was instructed by Physician and understands   losartan (COZAAR) 100 MG tablet Instructed patient per Anesthesia Guidelines  takes in pm    Prevpak as per surgeon  ERAS drink instructions as per surgeon    You will receive a phone call from hospital for arrival time  Please call surgeons office if any changes in your condition  Wear easy on/off clothing; consider type of surgery;  Valuables, jewelry, piercing's please keep at home  **COVID-19  education/surgical guidelines      Please: No contact lenses or eye make up, artificial eyelashes    Please secure transportation     Follow pre surgery showering or cleaning instructions as  Reviewed by nurse or surgeons office      Questions answered and concerns addressed

## 2021-05-16 ENCOUNTER — ANESTHESIA EVENT (OUTPATIENT)
Dept: PERIOP | Facility: HOSPITAL | Age: 41
DRG: 403 | End: 2021-05-16
Payer: COMMERCIAL

## 2021-05-18 ENCOUNTER — HOSPITAL ENCOUNTER (INPATIENT)
Facility: HOSPITAL | Age: 41
LOS: 1 days | Discharge: HOME | DRG: 403 | End: 2021-05-19
Attending: SURGERY | Admitting: SURGERY
Payer: COMMERCIAL

## 2021-05-18 ENCOUNTER — ANESTHESIA (OUTPATIENT)
Dept: PERIOP | Facility: HOSPITAL | Age: 41
DRG: 403 | End: 2021-05-18
Payer: COMMERCIAL

## 2021-05-18 DIAGNOSIS — I10 ESSENTIAL HYPERTENSION: Primary | ICD-10-CM

## 2021-05-18 DIAGNOSIS — E66.01 SEVERE OBESITY (BMI 35.0-39.9) WITH COMORBIDITY (HCC): ICD-10-CM

## 2021-05-18 DIAGNOSIS — I49.8 BIGEMINY: ICD-10-CM

## 2021-05-18 DIAGNOSIS — R06.83 SNORING: ICD-10-CM

## 2021-05-18 DIAGNOSIS — I10 BENIGN HYPERTENSION: ICD-10-CM

## 2021-05-18 PROBLEM — Z98.890 PONV (POSTOPERATIVE NAUSEA AND VOMITING): Status: ACTIVE | Noted: 2021-05-18

## 2021-05-18 PROBLEM — R11.2 PONV (POSTOPERATIVE NAUSEA AND VOMITING): Status: ACTIVE | Noted: 2021-05-18

## 2021-05-18 LAB
ABO GROUP BLD: NORMAL
BLD GP AB SCN SERPL QL: NEGATIVE
EXT PREGNANCY TEST URINE: NEGATIVE
EXT. CONTROL: NORMAL
RH BLD: POSITIVE
SPECIMEN EXPIRATION DATE: NORMAL

## 2021-05-18 PROCEDURE — 8E0W4CZ ROBOTIC ASSISTED PROCEDURE OF TRUNK REGION, PERCUTANEOUS ENDOSCOPIC APPROACH: ICD-10-PCS | Performed by: SURGERY

## 2021-05-18 PROCEDURE — 86901 BLOOD TYPING SEROLOGIC RH(D): CPT | Performed by: ANESTHESIOLOGY

## 2021-05-18 PROCEDURE — 86900 BLOOD TYPING SEROLOGIC ABO: CPT | Performed by: ANESTHESIOLOGY

## 2021-05-18 PROCEDURE — 43775 LAP SLEEVE GASTRECTOMY: CPT | Performed by: SURGERY

## 2021-05-18 PROCEDURE — C9290 INJ, BUPIVACAINE LIPOSOME: HCPCS | Performed by: SURGERY

## 2021-05-18 PROCEDURE — 0DB64Z3 EXCISION OF STOMACH, PERCUTANEOUS ENDOSCOPIC APPROACH, VERTICAL: ICD-10-PCS | Performed by: SURGERY

## 2021-05-18 PROCEDURE — 81025 URINE PREGNANCY TEST: CPT | Performed by: SURGERY

## 2021-05-18 PROCEDURE — 88307 TISSUE EXAM BY PATHOLOGIST: CPT | Performed by: PATHOLOGY

## 2021-05-18 PROCEDURE — 86850 RBC ANTIBODY SCREEN: CPT | Performed by: ANESTHESIOLOGY

## 2021-05-18 RX ORDER — NEOSTIGMINE METHYLSULFATE 1 MG/ML
INJECTION INTRAVENOUS AS NEEDED
Status: DISCONTINUED | OUTPATIENT
Start: 2021-05-18 | End: 2021-05-18

## 2021-05-18 RX ORDER — BUPIVACAINE HYDROCHLORIDE AND EPINEPHRINE 5; 5 MG/ML; UG/ML
INJECTION, SOLUTION PERINEURAL AS NEEDED
Status: DISCONTINUED | OUTPATIENT
Start: 2021-05-18 | End: 2021-05-18 | Stop reason: HOSPADM

## 2021-05-18 RX ORDER — ONDANSETRON 2 MG/ML
4 INJECTION INTRAMUSCULAR; INTRAVENOUS EVERY 6 HOURS PRN
Status: DISCONTINUED | OUTPATIENT
Start: 2021-05-18 | End: 2021-05-19

## 2021-05-18 RX ORDER — GLYCOPYRROLATE 0.2 MG/ML
INJECTION INTRAMUSCULAR; INTRAVENOUS AS NEEDED
Status: DISCONTINUED | OUTPATIENT
Start: 2021-05-18 | End: 2021-05-18

## 2021-05-18 RX ORDER — MAGNESIUM HYDROXIDE 1200 MG/15ML
LIQUID ORAL AS NEEDED
Status: DISCONTINUED | OUTPATIENT
Start: 2021-05-18 | End: 2021-05-18 | Stop reason: HOSPADM

## 2021-05-18 RX ORDER — MORPHINE SULFATE 4 MG/ML
4 INJECTION, SOLUTION INTRAMUSCULAR; INTRAVENOUS EVERY 4 HOURS PRN
Status: DISCONTINUED | OUTPATIENT
Start: 2021-05-18 | End: 2021-05-19 | Stop reason: HOSPADM

## 2021-05-18 RX ORDER — ROCURONIUM BROMIDE 10 MG/ML
INJECTION, SOLUTION INTRAVENOUS AS NEEDED
Status: DISCONTINUED | OUTPATIENT
Start: 2021-05-18 | End: 2021-05-18

## 2021-05-18 RX ORDER — MULTIVITAMIN
1 TABLET ORAL DAILY
COMMUNITY

## 2021-05-18 RX ORDER — HYDROMORPHONE HCL/PF 1 MG/ML
0.5 SYRINGE (ML) INJECTION
Status: COMPLETED | OUTPATIENT
Start: 2021-05-18 | End: 2021-05-18

## 2021-05-18 RX ORDER — METOCLOPRAMIDE HYDROCHLORIDE 5 MG/ML
10 INJECTION INTRAMUSCULAR; INTRAVENOUS EVERY 6 HOURS PRN
Status: DISCONTINUED | OUTPATIENT
Start: 2021-05-18 | End: 2021-05-19

## 2021-05-18 RX ORDER — SODIUM CHLORIDE 9 MG/ML
125 INJECTION, SOLUTION INTRAVENOUS CONTINUOUS
Status: DISCONTINUED | OUTPATIENT
Start: 2021-05-18 | End: 2021-05-18

## 2021-05-18 RX ORDER — PROPOFOL 10 MG/ML
INJECTION, EMULSION INTRAVENOUS AS NEEDED
Status: DISCONTINUED | OUTPATIENT
Start: 2021-05-18 | End: 2021-05-18

## 2021-05-18 RX ORDER — DIPHENHYDRAMINE HCL 25 MG
25 TABLET ORAL EVERY 8 HOURS PRN
Status: DISCONTINUED | OUTPATIENT
Start: 2021-05-18 | End: 2021-05-19 | Stop reason: HOSPADM

## 2021-05-18 RX ORDER — FENTANYL CITRATE/PF 50 MCG/ML
25 SYRINGE (ML) INJECTION
Status: COMPLETED | OUTPATIENT
Start: 2021-05-18 | End: 2021-05-18

## 2021-05-18 RX ORDER — DEXAMETHASONE SODIUM PHOSPHATE 4 MG/ML
INJECTION, SOLUTION INTRA-ARTICULAR; INTRALESIONAL; INTRAMUSCULAR; INTRAVENOUS; SOFT TISSUE AS NEEDED
Status: DISCONTINUED | OUTPATIENT
Start: 2021-05-18 | End: 2021-05-18

## 2021-05-18 RX ORDER — SIMETHICONE 80 MG
80 TABLET,CHEWABLE ORAL 4 TIMES DAILY PRN
Status: DISCONTINUED | OUTPATIENT
Start: 2021-05-18 | End: 2021-05-19 | Stop reason: HOSPADM

## 2021-05-18 RX ORDER — SODIUM CHLORIDE, SODIUM LACTATE, POTASSIUM CHLORIDE, CALCIUM CHLORIDE 600; 310; 30; 20 MG/100ML; MG/100ML; MG/100ML; MG/100ML
75 INJECTION, SOLUTION INTRAVENOUS CONTINUOUS
Status: DISCONTINUED | OUTPATIENT
Start: 2021-05-18 | End: 2021-05-19 | Stop reason: HOSPADM

## 2021-05-18 RX ORDER — FENTANYL CITRATE 50 UG/ML
INJECTION, SOLUTION INTRAMUSCULAR; INTRAVENOUS AS NEEDED
Status: DISCONTINUED | OUTPATIENT
Start: 2021-05-18 | End: 2021-05-18

## 2021-05-18 RX ORDER — ACETAMINOPHEN 160 MG/5ML
975 SUSPENSION, ORAL (FINAL DOSE FORM) ORAL EVERY 8 HOURS
Status: DISCONTINUED | OUTPATIENT
Start: 2021-05-18 | End: 2021-05-19 | Stop reason: HOSPADM

## 2021-05-18 RX ORDER — HYDRALAZINE HYDROCHLORIDE 20 MG/ML
5 INJECTION INTRAMUSCULAR; INTRAVENOUS ONCE
Status: COMPLETED | OUTPATIENT
Start: 2021-05-18 | End: 2021-05-18

## 2021-05-18 RX ORDER — HYDRALAZINE HYDROCHLORIDE 20 MG/ML
10 INJECTION INTRAMUSCULAR; INTRAVENOUS ONCE
Status: COMPLETED | OUTPATIENT
Start: 2021-05-18 | End: 2021-05-18

## 2021-05-18 RX ORDER — CEFAZOLIN SODIUM 2 G/50ML
2000 SOLUTION INTRAVENOUS EVERY 8 HOURS
Status: DISCONTINUED | OUTPATIENT
Start: 2021-05-18 | End: 2021-05-18 | Stop reason: HOSPADM

## 2021-05-18 RX ORDER — SCOLOPAMINE TRANSDERMAL SYSTEM 1 MG/1
1 PATCH, EXTENDED RELEASE TRANSDERMAL
Status: DISCONTINUED | OUTPATIENT
Start: 2021-05-18 | End: 2021-05-18 | Stop reason: HOSPADM

## 2021-05-18 RX ORDER — OXYCODONE HCL 5 MG/5 ML
10 SOLUTION, ORAL ORAL EVERY 4 HOURS PRN
Status: DISCONTINUED | OUTPATIENT
Start: 2021-05-18 | End: 2021-05-19 | Stop reason: HOSPADM

## 2021-05-18 RX ORDER — ONDANSETRON 2 MG/ML
4 INJECTION INTRAMUSCULAR; INTRAVENOUS ONCE AS NEEDED
Status: COMPLETED | OUTPATIENT
Start: 2021-05-18 | End: 2021-05-18

## 2021-05-18 RX ORDER — MIDAZOLAM HYDROCHLORIDE 2 MG/2ML
INJECTION, SOLUTION INTRAMUSCULAR; INTRAVENOUS AS NEEDED
Status: DISCONTINUED | OUTPATIENT
Start: 2021-05-18 | End: 2021-05-18

## 2021-05-18 RX ORDER — OXYCODONE HCL 5 MG/5 ML
5 SOLUTION, ORAL ORAL EVERY 4 HOURS PRN
Status: DISCONTINUED | OUTPATIENT
Start: 2021-05-18 | End: 2021-05-19 | Stop reason: HOSPADM

## 2021-05-18 RX ORDER — PROMETHAZINE HYDROCHLORIDE 25 MG/ML
25 INJECTION, SOLUTION INTRAMUSCULAR; INTRAVENOUS EVERY 6 HOURS PRN
Status: DISCONTINUED | OUTPATIENT
Start: 2021-05-18 | End: 2021-05-19

## 2021-05-18 RX ADMIN — SODIUM CHLORIDE: 0.9 INJECTION, SOLUTION INTRAVENOUS at 08:31

## 2021-05-18 RX ADMIN — FENTANYL CITRATE 50 MCG: 50 INJECTION INTRAMUSCULAR; INTRAVENOUS at 09:41

## 2021-05-18 RX ADMIN — FAMOTIDINE 20 MG: 10 INJECTION INTRAVENOUS at 12:39

## 2021-05-18 RX ADMIN — ONDANSETRON 4 MG: 2 INJECTION INTRAMUSCULAR; INTRAVENOUS at 20:26

## 2021-05-18 RX ADMIN — LIDOCAINE HYDROCHLORIDE 100 MG: 20 INJECTION, SOLUTION INTRAVENOUS at 08:18

## 2021-05-18 RX ADMIN — DEXAMETHASONE SODIUM PHOSPHATE 4 MG: 4 INJECTION INTRA-ARTICULAR; INTRALESIONAL; INTRAMUSCULAR; INTRAVENOUS; SOFT TISSUE at 08:38

## 2021-05-18 RX ADMIN — FENTANYL CITRATE 25 MCG: 50 INJECTION INTRAMUSCULAR; INTRAVENOUS at 10:21

## 2021-05-18 RX ADMIN — NEOSTIGMINE METHYLSULFATE 4 MG: 1 INJECTION INTRAVENOUS at 09:35

## 2021-05-18 RX ADMIN — FENTANYL CITRATE 25 MCG: 50 INJECTION INTRAMUSCULAR; INTRAVENOUS at 10:14

## 2021-05-18 RX ADMIN — MIDAZOLAM 2 MG: 1 INJECTION INTRAMUSCULAR; INTRAVENOUS at 08:09

## 2021-05-18 RX ADMIN — HYDROMORPHONE HYDROCHLORIDE 0.5 MG: 1 INJECTION, SOLUTION INTRAMUSCULAR; INTRAVENOUS; SUBCUTANEOUS at 10:45

## 2021-05-18 RX ADMIN — HYDROMORPHONE HYDROCHLORIDE 0.5 MG: 1 INJECTION, SOLUTION INTRAMUSCULAR; INTRAVENOUS; SUBCUTANEOUS at 10:54

## 2021-05-18 RX ADMIN — FENTANYL CITRATE 100 MCG: 50 INJECTION INTRAMUSCULAR; INTRAVENOUS at 08:17

## 2021-05-18 RX ADMIN — HYDRALAZINE HYDROCHLORIDE 10 MG: 20 INJECTION, SOLUTION INTRAMUSCULAR; INTRAVENOUS at 14:17

## 2021-05-18 RX ADMIN — ENOXAPARIN SODIUM 40 MG: 40 INJECTION SUBCUTANEOUS at 06:02

## 2021-05-18 RX ADMIN — ROCURONIUM BROMIDE 10 MG: 10 INJECTION, SOLUTION INTRAVENOUS at 09:04

## 2021-05-18 RX ADMIN — PHENYLEPHRINE HYDROCHLORIDE 100 MCG: 10 INJECTION INTRAVENOUS at 08:34

## 2021-05-18 RX ADMIN — ONDANSETRON 4 MG: 2 INJECTION INTRAMUSCULAR; INTRAVENOUS at 12:39

## 2021-05-18 RX ADMIN — GLYCOPYRROLATE 0.6 MG: 0.2 INJECTION, SOLUTION INTRAMUSCULAR; INTRAVENOUS at 09:35

## 2021-05-18 RX ADMIN — FENTANYL CITRATE 25 MCG: 50 INJECTION INTRAMUSCULAR; INTRAVENOUS at 10:09

## 2021-05-18 RX ADMIN — HYDROMORPHONE HYDROCHLORIDE 0.5 MG: 1 INJECTION, SOLUTION INTRAMUSCULAR; INTRAVENOUS; SUBCUTANEOUS at 11:04

## 2021-05-18 RX ADMIN — OXYCODONE HYDROCHLORIDE 10 MG: 5 SOLUTION ORAL at 14:17

## 2021-05-18 RX ADMIN — HYDROMORPHONE HYDROCHLORIDE 0.5 MG: 1 INJECTION, SOLUTION INTRAMUSCULAR; INTRAVENOUS; SUBCUTANEOUS at 10:36

## 2021-05-18 RX ADMIN — ROCURONIUM BROMIDE 40 MG: 10 INJECTION, SOLUTION INTRAVENOUS at 08:18

## 2021-05-18 RX ADMIN — FENTANYL CITRATE 25 MCG: 50 INJECTION INTRAMUSCULAR; INTRAVENOUS at 10:29

## 2021-05-18 RX ADMIN — SODIUM CHLORIDE 125 ML/HR: 0.9 INJECTION, SOLUTION INTRAVENOUS at 05:54

## 2021-05-18 RX ADMIN — MORPHINE SULFATE 4 MG: 4 INJECTION INTRAVENOUS at 22:15

## 2021-05-18 RX ADMIN — SODIUM CHLORIDE, SODIUM LACTATE, POTASSIUM CHLORIDE, AND CALCIUM CHLORIDE 75 ML/HR: .6; .31; .03; .02 INJECTION, SOLUTION INTRAVENOUS at 20:37

## 2021-05-18 RX ADMIN — SODIUM CHLORIDE, SODIUM LACTATE, POTASSIUM CHLORIDE, AND CALCIUM CHLORIDE 75 ML/HR: .6; .31; .03; .02 INJECTION, SOLUTION INTRAVENOUS at 10:11

## 2021-05-18 RX ADMIN — ONDANSETRON 4 MG: 2 INJECTION INTRAMUSCULAR; INTRAVENOUS at 10:41

## 2021-05-18 RX ADMIN — FAMOTIDINE 20 MG: 10 INJECTION INTRAVENOUS at 20:26

## 2021-05-18 RX ADMIN — SCOPALAMINE 1 PATCH: 1 PATCH, EXTENDED RELEASE TRANSDERMAL at 06:01

## 2021-05-18 RX ADMIN — CEFAZOLIN SODIUM 2000 MG: 2 SOLUTION INTRAVENOUS at 08:15

## 2021-05-18 RX ADMIN — FENTANYL CITRATE 50 MCG: 50 INJECTION INTRAMUSCULAR; INTRAVENOUS at 09:34

## 2021-05-18 RX ADMIN — HYDRALAZINE HYDROCHLORIDE 5 MG: 20 INJECTION, SOLUTION INTRAMUSCULAR; INTRAVENOUS at 23:52

## 2021-05-18 RX ADMIN — PHENYLEPHRINE HYDROCHLORIDE 100 MCG: 10 INJECTION INTRAVENOUS at 09:02

## 2021-05-18 RX ADMIN — PROPOFOL 180 MG: 10 INJECTION, EMULSION INTRAVENOUS at 08:18

## 2021-05-18 RX ADMIN — MORPHINE SULFATE 4 MG: 4 INJECTION INTRAVENOUS at 12:39

## 2021-05-18 RX ADMIN — OXYCODONE HYDROCHLORIDE 10 MG: 5 SOLUTION ORAL at 20:34

## 2021-05-18 NOTE — INTERVAL H&P NOTE
H&P reviewed  After examining the patient I find no changes in the patients condition since the H&P had been written      Vitals:    05/18/21 0529   BP: 121/78   Pulse: 64   Resp: 16   Temp: 97 7 °F (36 5 °C)   SpO2: 99%

## 2021-05-18 NOTE — PLAN OF CARE
Problem: PAIN - ADULT  Goal: Verbalizes/displays adequate comfort level or baseline comfort level  Description: Interventions:  - Encourage patient to monitor pain and request assistance  - Assess pain using appropriate pain scale  - Administer analgesics based on type and severity of pain and evaluate response  - Implement non-pharmacological measures as appropriate and evaluate response  - Consider cultural and social influences on pain and pain management  - Notify physician/advanced practitioner if interventions unsuccessful or patient reports new pain  Outcome: Progressing     Problem: INFECTION - ADULT  Goal: Absence or prevention of progression during hospitalization  Description: INTERVENTIONS:  - Assess and monitor for signs and symptoms of infection  - Monitor lab/diagnostic results  - Monitor all insertion sites, i e  indwelling lines, tubes, and drains  - Monitor endotracheal if appropriate and nasal secretions for changes in amount and color  - [place] appropriate cooling/warming therapies per order  - Administer medications as ordered  - Instruct and encourage patient and family to use good hand hygiene technique  - Identify and instruct in appropriate isolation precautions for identified infection/condition  Outcome: Progressing  Goal: Absence of fever/infection during neutropenic period  Description: INTERVENTIONS:  - Monitor WBC    Outcome: Progressing     Problem: SAFETY ADULT  Goal: Patient will remain free of falls  Description: INTERVENTIONS:  - Assess patient frequently for physical needs  -  Identify cognitive and physical deficits and behaviors that affect risk of falls    -  [place] fall precautions as indicated by assessment   - Educate patient/family on patient safety including physical limitations  - Instruct patient to call for assistance with activity based on assessment  - Modify environment to reduce risk of injury  - Consider OT/PT consult to assist with strengthening/mobility  Outcome: Progressing  Goal: Maintain or return to baseline ADL function  Description: INTERVENTIONS:  -  Assess patient's ability to carry out ADLs; assess patient's baseline for ADL function and identify physical deficits which impact ability to perform ADLs (bathing, care of mouth/teeth, toileting, grooming, dressing, etc )  - Assess/evaluate cause of self-care deficits   - Assess range of motion  - Assess patient's mobility; develop plan if impaired  - Assess patient's need for assistive devices and provide as appropriate  - Encourage maximum independence but intervene and supervise when necessary  - Involve family in performance of ADLs  - Assess for home care needs following discharge   - Consider OT consult to assist with ADL evaluation and planning for discharge  - Provide patient education as appropriate  Outcome: Progressing  Goal: Maintain or return mobility status to optimal level  Description: INTERVENTIONS:  - Assess patient's baseline mobility status (ambulation, transfers, stairs, etc )    - Identify cognitive and physical deficits and behaviors that affect mobility  - Identify mobility aids required to assist with transfers and/or ambulation (gait belt, sit-to-stand, lift, walker, cane, etc )  - [place] fall precautions as indicated by assessment  - Record patient progress and toleration of activity level on Mobility SBAR; progress patient to next Phase/Stage  - Instruct patient to call for assistance with activity based on assessment  - Consider rehabilitation consult to assist with strengthening/weightbearing, etc   Outcome: Progressing     Problem: DISCHARGE PLANNING  Goal: Discharge to home or other facility with appropriate resources  Description: INTERVENTIONS:  - Identify barriers to discharge w/patient and caregiver  - Arrange for needed discharge resources and transportation as appropriate  - Identify discharge learning needs (meds, wound care, etc )  - Arrange for interpretive services to assist at discharge as needed  - Refer to Case Management Department for coordinating discharge planning if the patient needs post-hospital services based on physician/advanced practitioner order or complex needs related to functional status, cognitive ability, or social support system  Outcome: Progressing     Problem: Knowledge Deficit  Goal: Patient/family/caregiver demonstrates understanding of disease process, treatment plan, medications, and discharge instructions  Description: Complete learning assessment and assess knowledge base    Interventions:  - Provide teaching at level of understanding  - Provide teaching via preferred learning methods  Outcome: Progressing

## 2021-05-18 NOTE — ANESTHESIA PREPROCEDURE EVALUATION
Procedure:  LAPAROSCOPIC SLEEVE GASTRECTOMY WITH ROBOTICS AND INTRAOPERATIVE EGD (N/A Abdomen)    Relevant Problems   ANESTHESIA   (+) PONV (postoperative nausea and vomiting)      CARDIO   (+) HTN (hypertension)      PULMONARY   (+) MARSHALL (obstructive sleep apnea)      Other   (+) Obesity, Class II, BMI 35-39 9        Physical Exam    Airway    Mallampati score: II  TM Distance: >3 FB       Dental       Cardiovascular  Cardiovascular exam normal    Pulmonary  Pulmonary exam normal     Other Findings        Anesthesia Plan  ASA Score- 3     Anesthesia Type- general with ASA Monitors  Additional Monitors:   Airway Plan:           Plan Factors-Exercise tolerance (METS): >4 METS  Chart reviewed  Patient is not a current smoker  Patient instructed to abstain from smoking on day of procedure  Patient did not smoke on day of surgery  Induction- intravenous  Postoperative Plan- Plan for postoperative opioid use  Planned trial extubation    Informed Consent- Anesthetic plan and risks discussed with patient

## 2021-05-18 NOTE — OP NOTE
OPERATIVE REPORT  PATIENT NAME: Georgina Ureña    :  1980  MRN: [id number]  Pt Location: [facility] OR ROOM 08    SURGERY DATE: 2021    Surgeon(s) and Role:     * Aretha Vargas MD - Primary     * Catherine Lieberman MD - Fellow    Preop Diagnosis:  Severe obesity (BMI 35 0-39  9) with comorbidity ([address] ) [E66 01]  Benign hypertension [I10]  Snoring [R06 83]    Post-Op Diagnosis Codes: * Severe obesity (BMI 35 0-39  9) with comorbidity ([address] ) [E66 01]     * Benign hypertension [I10]     * Snoring [R06 83]    Procedure(s) (LRB):  LAPAROSCOPIC SLEEVE GASTRECTOMY WITH ROBOTICS (N/A)    Specimen(s):  ID Type Source Tests Collected by Time Destination   1 : PORTION OF STOMACH Tissue Stomach TISSUE EXAM Aretha Vargas MD 2021 [id number]        Estimated Blood Loss:   20 ml    Drains:  * No LDAs found *    Anesthesia Type:   General    Operative Indications:  Severe obesity (BMI 35 0-39  9) with comorbidity ([address] ) [E66 01]  Benign hypertension [I10]  Snoring [R06 83]      Operative Findings:  Normal Findings    Complications:   None    Procedure and Technique:  Robotic sleeve gastrectomy   I was present for the entire procedure    Patient Disposition:  hemodynamically stable     No qualified resident was available  Assistant was necessary for instrument exchange and counter traction and assistance with stapling     Indication:   Patient suffers from morbid obesity and associated co-morbidities  and failed to achieve any meaningful or sustainable weight loss and was therefore consented to undergo a laparoscopic sleeve gastrectomy  Risks and benefits were explained to the patient, patient consented for the procedure  Procedure: The patient was brought to the operating room and placed in a supine position  The patient received a dose of IV antibiotics and a dose of subcutaneous Lovenox prior to the procedure  The patient was induced under general endotracheal anesthesia   The abdominal wall was prepped and draped under sterile conditions in the usual fashion  The procedure was started by obtaining access to the abdominal cavity using a Veress needle to the left side of the midline around 6 inches from the xiphoid the abdominal cavity was insufflated with CO2 to a pressure of 15 mmHg  After that, the abdomen was entered with an 8 mm trocar using an Optiview trocar under direct visualization  At that point, an 8 and 12 mm robotic trocars were placed on the right side of the abdominal wall, under direct visualization, and another 8 mm robotic trocar and 12 mm assistant port were placed on the left side of the abdominal wall, also under direct visualization  A TAP block was performed using 20 ml of Exparel mixed with saline and 0 5 % Marcaine for a total of 100 ml  The patient was then placed in a reverse Trendelenburg position  A Jayla retractor was placed through a small stab incision below the xiphoid and was used to retract the left lobe of the liver in a medial fashion  The robot was then brought into the surgical field and the arms docked  An OG tube was placed to decompress the stomach and then removed immediately  The angle of His was then dissected using the vessel sealer  At that point, we turned our attention to the pylorus and using the vessel sealerl, the gastrocolic ligament was taken down starting 4 cm proximal to the pylorus, all the way up to the level of the short gastric vessels which were taken down with the vessel sealer  as well  The angle of His was also dissected and all the posterior attachments of the fundus were taken down with the vessel sealer and with sharp dissection, the spleen was kept out of harms way and the left elva was exposed and the stomach was completely mobilized off the left elva and rotated medially  There were some posterior attachments to the posterior wall of the stomach and those were taken down sharply with laparoscopic scissors   At that point, the anesthesiologist was asked to insert a 36-Lao Bougie  The Bougie was secured in place by the anesthesiologist      We started transecting the stomach using a green 60 mm SureForm cartridge and the rest of the firings consisted of blue loads  The 36-Lao Bougie was kept in place throughout the performance of the sleeve gastrectomy  We made particular attention during the transaction of the greater curvature to retract the stomach laterally at the site of the transected vessels  to prevent corkscrewing of the gastric sleeve  We also avoided getting too close to the incisura to prevent  narrowing at the level of the incisura  The staple line was hemostatic and well formed and there was no evidence of narrowing at the incisura  The staple line was then imbricated using 2-0 V LOC suture in a running fashion while the bougie is in place  At the end, the anesthesiologist was asked to remove the [address]  The surgical field was inspected one more time and appeared hemostatic  We then removed the [facility] liver retractor  The 12 mm port was extended, and then it was dilated  After that, the stomach specimen was retrieved and sent to Pathology  Sponge count was completed and was correct  The 12 mm port was then closed using #1 Vicryl in a simple fashion  All the trocars were removed under direct visualization, and the skin edges were approximated using 4-0 Monocryl in an interrupted, inverted fashion  Histoacryl was then applied to the skin incisions        The patient was extubated and transferred to the PACU in stable condition    SIGNATURE: Des Washington MD  DATE: May 18, 2021  TIME: 9:32 AM

## 2021-05-18 NOTE — ANESTHESIA POSTPROCEDURE EVALUATION
Post-Op Assessment Note    CV Status:  Stable  Pain Score: 5    Pain management: adequate     Mental Status:  Alert and awake   Hydration Status:  Euvolemic   PONV Controlled:  Controlled   Airway Patency:  Patent  Airway: intubated   Two or more mitigation strategies used for obstructive sleep apnea   Post Op Vitals Reviewed: Yes      Staff: Anesthesiologist         No complications documented      BP     Temp      Pulse     Resp      SpO2

## 2021-05-19 ENCOUNTER — APPOINTMENT (INPATIENT)
Dept: NON INVASIVE DIAGNOSTICS | Facility: HOSPITAL | Age: 41
DRG: 403 | End: 2021-05-19
Payer: COMMERCIAL

## 2021-05-19 VITALS
HEIGHT: 64 IN | RESPIRATION RATE: 18 BRPM | TEMPERATURE: 99.2 F | SYSTOLIC BLOOD PRESSURE: 146 MMHG | OXYGEN SATURATION: 96 % | WEIGHT: 217.15 LBS | BODY MASS INDEX: 37.07 KG/M2 | DIASTOLIC BLOOD PRESSURE: 86 MMHG | HEART RATE: 57 BPM

## 2021-05-19 PROBLEM — I49.8 BIGEMINY: Status: ACTIVE | Noted: 2021-05-19

## 2021-05-19 LAB
ANION GAP SERPL CALCULATED.3IONS-SCNC: 10 MMOL/L (ref 4–13)
ATRIAL RATE: 82 BPM
BUN SERPL-MCNC: 6 MG/DL (ref 5–25)
CALCIUM SERPL-MCNC: 9 MG/DL (ref 8.3–10.1)
CHLORIDE SERPL-SCNC: 103 MMOL/L (ref 100–108)
CO2 SERPL-SCNC: 24 MMOL/L (ref 21–32)
CREAT SERPL-MCNC: 0.68 MG/DL (ref 0.6–1.3)
ERYTHROCYTE [DISTWIDTH] IN BLOOD BY AUTOMATED COUNT: 15 % (ref 11.6–15.1)
GFR SERPL CREATININE-BSD FRML MDRD: 108 ML/MIN/1.73SQ M
GLUCOSE SERPL-MCNC: 109 MG/DL (ref 65–140)
HCT VFR BLD AUTO: 42.7 % (ref 34.8–46.1)
HGB BLD-MCNC: 13.4 G/DL (ref 11.5–15.4)
MAGNESIUM SERPL-MCNC: 2.3 MG/DL (ref 1.6–2.6)
MCH RBC QN AUTO: 32.4 PG (ref 26.8–34.3)
MCHC RBC AUTO-ENTMCNC: 31.4 G/DL (ref 31.4–37.4)
MCV RBC AUTO: 103 FL (ref 82–98)
P AXIS: 52 DEGREES
PLATELET # BLD AUTO: 244 THOUSANDS/UL (ref 149–390)
PMV BLD AUTO: 11.5 FL (ref 8.9–12.7)
POTASSIUM SERPL-SCNC: 3.8 MMOL/L (ref 3.5–5.3)
PR INTERVAL: 152 MS
QRS AXIS: -8 DEGREES
QRSD INTERVAL: 84 MS
QT INTERVAL: 452 MS
QTC INTERVAL: 528 MS
RBC # BLD AUTO: 4.13 MILLION/UL (ref 3.81–5.12)
SODIUM SERPL-SCNC: 137 MMOL/L (ref 136–145)
T WAVE AXIS: 30 DEGREES
TROPONIN I SERPL-MCNC: <0.02 NG/ML
TSH SERPL DL<=0.05 MIU/L-ACNC: 0.99 UIU/ML (ref 0.36–3.74)
VENTRICULAR RATE: 82 BPM
WBC # BLD AUTO: 9 THOUSAND/UL (ref 4.31–10.16)

## 2021-05-19 PROCEDURE — 80048 BASIC METABOLIC PNL TOTAL CA: CPT | Performed by: PHYSICIAN ASSISTANT

## 2021-05-19 PROCEDURE — 99024 POSTOP FOLLOW-UP VISIT: CPT | Performed by: SURGERY

## 2021-05-19 PROCEDURE — 93010 ELECTROCARDIOGRAM REPORT: CPT | Performed by: INTERNAL MEDICINE

## 2021-05-19 PROCEDURE — 83735 ASSAY OF MAGNESIUM: CPT | Performed by: PHYSICIAN ASSISTANT

## 2021-05-19 PROCEDURE — 93005 ELECTROCARDIOGRAM TRACING: CPT

## 2021-05-19 PROCEDURE — 85027 COMPLETE CBC AUTOMATED: CPT | Performed by: PHYSICIAN ASSISTANT

## 2021-05-19 PROCEDURE — 84484 ASSAY OF TROPONIN QUANT: CPT | Performed by: INTERNAL MEDICINE

## 2021-05-19 PROCEDURE — 93306 TTE W/DOPPLER COMPLETE: CPT | Performed by: INTERNAL MEDICINE

## 2021-05-19 PROCEDURE — 99253 IP/OBS CNSLTJ NEW/EST LOW 45: CPT | Performed by: INTERNAL MEDICINE

## 2021-05-19 PROCEDURE — NC001 PR NO CHARGE: Performed by: INTERNAL MEDICINE

## 2021-05-19 PROCEDURE — 84443 ASSAY THYROID STIM HORMONE: CPT | Performed by: INTERNAL MEDICINE

## 2021-05-19 PROCEDURE — 99254 IP/OBS CNSLTJ NEW/EST MOD 60: CPT | Performed by: INTERNAL MEDICINE

## 2021-05-19 PROCEDURE — 93306 TTE W/DOPPLER COMPLETE: CPT

## 2021-05-19 RX ORDER — METOPROLOL TARTRATE 5 MG/5ML
5 INJECTION INTRAVENOUS EVERY 6 HOURS PRN
Status: DISCONTINUED | OUTPATIENT
Start: 2021-05-19 | End: 2021-05-19 | Stop reason: HOSPADM

## 2021-05-19 RX ORDER — HYDRALAZINE HYDROCHLORIDE 20 MG/ML
10 INJECTION INTRAMUSCULAR; INTRAVENOUS EVERY 6 HOURS PRN
Status: DISCONTINUED | OUTPATIENT
Start: 2021-05-19 | End: 2021-05-19 | Stop reason: HOSPADM

## 2021-05-19 RX ORDER — PROMETHAZINE HYDROCHLORIDE 25 MG/ML
25 INJECTION, SOLUTION INTRAMUSCULAR; INTRAVENOUS EVERY 6 HOURS PRN
Status: DISCONTINUED | OUTPATIENT
Start: 2021-05-19 | End: 2021-05-19 | Stop reason: HOSPADM

## 2021-05-19 RX ORDER — ONDANSETRON 2 MG/ML
4 INJECTION INTRAMUSCULAR; INTRAVENOUS EVERY 6 HOURS PRN
Status: DISCONTINUED | OUTPATIENT
Start: 2021-05-19 | End: 2021-05-19 | Stop reason: HOSPADM

## 2021-05-19 RX ORDER — AMLODIPINE BESYLATE 5 MG/1
5 TABLET ORAL DAILY
Status: DISCONTINUED | OUTPATIENT
Start: 2021-05-19 | End: 2021-05-19 | Stop reason: HOSPADM

## 2021-05-19 RX ORDER — LOSARTAN POTASSIUM 50 MG/1
50 TABLET ORAL DAILY
Status: DISCONTINUED | OUTPATIENT
Start: 2021-05-19 | End: 2021-05-19

## 2021-05-19 RX ORDER — METOPROLOL SUCCINATE 25 MG/1
12.5 TABLET, EXTENDED RELEASE ORAL DAILY
Qty: 15 TABLET | Refills: 0 | Status: SHIPPED | OUTPATIENT
Start: 2021-05-19 | End: 2021-06-18

## 2021-05-19 RX ORDER — LOSARTAN POTASSIUM 100 MG/1
50 TABLET ORAL DAILY
Qty: 15 TABLET | Refills: 0 | Status: SHIPPED | OUTPATIENT
Start: 2021-05-19 | End: 2021-06-03 | Stop reason: CLARIF

## 2021-05-19 RX ADMIN — Medication 12.5 MG: at 12:20

## 2021-05-19 RX ADMIN — LOSARTAN POTASSIUM 50 MG: 50 TABLET, FILM COATED ORAL at 09:27

## 2021-05-19 RX ADMIN — SODIUM CHLORIDE, SODIUM LACTATE, POTASSIUM CHLORIDE, AND CALCIUM CHLORIDE 75 ML/HR: .6; .31; .03; .02 INJECTION, SOLUTION INTRAVENOUS at 12:23

## 2021-05-19 RX ADMIN — HYDRALAZINE HYDROCHLORIDE 10 MG: 20 INJECTION, SOLUTION INTRAMUSCULAR; INTRAVENOUS at 03:03

## 2021-05-19 RX ADMIN — METOROPROLOL TARTRATE 5 MG: 5 INJECTION, SOLUTION INTRAVENOUS at 01:17

## 2021-05-19 RX ADMIN — ONDANSETRON 4 MG: 2 INJECTION INTRAMUSCULAR; INTRAVENOUS at 04:55

## 2021-05-19 RX ADMIN — AMLODIPINE BESYLATE 5 MG: 5 TABLET ORAL at 09:27

## 2021-05-19 RX ADMIN — ENOXAPARIN SODIUM 40 MG: 40 INJECTION SUBCUTANEOUS at 09:27

## 2021-05-19 RX ADMIN — ACETAMINOPHEN 975 MG: 160 SUSPENSION ORAL at 08:38

## 2021-05-19 RX ADMIN — FAMOTIDINE 20 MG: 10 INJECTION INTRAVENOUS at 08:38

## 2021-05-19 NOTE — PLAN OF CARE
Problem: PAIN - ADULT  Goal: Verbalizes/displays adequate comfort level or baseline comfort level  Description: Interventions:  - Encourage patient to monitor pain and request assistance  - Assess pain using appropriate pain scale  - Administer analgesics based on type and severity of pain and evaluate response  - Implement non-pharmacological measures as appropriate and evaluate response  - Consider cultural and social influences on pain and pain management  - Notify physician/advanced practitioner if interventions unsuccessful or patient reports new pain  Outcome: Progressing     Problem: INFECTION - ADULT  Goal: Absence or prevention of progression during hospitalization  Description: INTERVENTIONS:  - Assess and monitor for signs and symptoms of infection  - Monitor lab/diagnostic results  - Monitor all insertion sites, i e  indwelling lines, tubes, and drains  - Monitor endotracheal if appropriate and nasal secretions for changes in amount and color  - [place] appropriate cooling/warming therapies per order  - Administer medications as ordered  - Instruct and encourage patient and family to use good hand hygiene technique  - Identify and instruct in appropriate isolation precautions for identified infection/condition  Outcome: Progressing  Goal: Absence of fever/infection during neutropenic period  Description: INTERVENTIONS:  - Monitor WBC    Outcome: Progressing     Problem: SAFETY ADULT  Goal: Patient will remain free of falls  Description: INTERVENTIONS:  - Assess patient frequently for physical needs  -  Identify cognitive and physical deficits and behaviors that affect risk of falls    -  [place] fall precautions as indicated by assessment   - Educate patient/family on patient safety including physical limitations  - Instruct patient to call for assistance with activity based on assessment  - Modify environment to reduce risk of injury  - Consider OT/PT consult to assist with strengthening/mobility  Outcome: Progressing  Goal: Maintain or return to baseline ADL function  Description: INTERVENTIONS:  -  Assess patient's ability to carry out ADLs; assess patient's baseline for ADL function and identify physical deficits which impact ability to perform ADLs (bathing, care of mouth/teeth, toileting, grooming, dressing, etc )  - Assess/evaluate cause of self-care deficits   - Assess range of motion  - Assess patient's mobility; develop plan if impaired  - Assess patient's need for assistive devices and provide as appropriate  - Encourage maximum independence but intervene and supervise when necessary  - Involve family in performance of ADLs  - Assess for home care needs following discharge   - Consider OT consult to assist with ADL evaluation and planning for discharge  - Provide patient education as appropriate  Outcome: Progressing  Goal: Maintain or return mobility status to optimal level  Description: INTERVENTIONS:  - Assess patient's baseline mobility status (ambulation, transfers, stairs, etc )    - Identify cognitive and physical deficits and behaviors that affect mobility  - Identify mobility aids required to assist with transfers and/or ambulation (gait belt, sit-to-stand, lift, walker, cane, etc )  - [place] fall precautions as indicated by assessment  - Record patient progress and toleration of activity level on Mobility SBAR; progress patient to next Phase/Stage  - Instruct patient to call for assistance with activity based on assessment  - Consider rehabilitation consult to assist with strengthening/weightbearing, etc   Outcome: Progressing     Problem: DISCHARGE PLANNING  Goal: Discharge to home or other facility with appropriate resources  Description: INTERVENTIONS:  - Identify barriers to discharge w/patient and caregiver  - Arrange for needed discharge resources and transportation as appropriate  - Identify discharge learning needs (meds, wound care, etc )  - Arrange for interpretive services to assist at discharge as needed  - Refer to Case Management Department for coordinating discharge planning if the patient needs post-hospital services based on physician/advanced practitioner order or complex needs related to functional status, cognitive ability, or social support system  Outcome: Progressing     Problem: Knowledge Deficit  Goal: Patient/family/caregiver demonstrates understanding of disease process, treatment plan, medications, and discharge instructions  Description: Complete learning assessment and assess knowledge base    Interventions:  - Provide teaching at level of understanding  - Provide teaching via preferred learning methods  Outcome: Progressing

## 2021-05-19 NOTE — CONSULTS
ENCOUNTER DATE: 05/19/21 10:42 AM  PATIENT NAME: Andre Campa   1980    [id number]  Age: 41 y o  Sex: female  AUTHOR: Cristopher Medina MD  [facility] PHYSICIAN: Nick Zhu MD  INPATIENT ATTENDING PHYSICIAN:  [address]:  PVCs with bigeminy    HISTORY OF PRESENT ILLNESS:  Patient is a pleasant 47-year-old female with past medical history significant for obesity,well-controlled hypertension who presented to the hospital on 5/18 for elective laparoscopic sleeve gastrectomy  -presentation patient was hemodynamically stable, initial blood work is completely unremarkable   -patient was recently evaluated by Cardiology Dr Faith Abraham  at the office on 4/2/21, EKG during that visit was completely unremarkable   -patient also was diagnosed with H pylori after an EGD as part of her preoperative workup, she just finished triple therapy with clarithromycin, amoxicillin and Protonix 2 days prior to her presentation  -patient is currently day 1 status post robotic laparoscopic sleeve gastrectomy by bariatric surgery  Patient was monitored postsurgical in tele with noted QTC prolongation and frequent PVCs with bigeminy pattern   -patient denies any chest pains, palpitations, shortness of breath, she endorses lightheadedness when she got a bed to the restroom, she denies any cardiac history in the past, excellent functional status, denies any orthopnea, PND, no family history of cardiac disease        PAST MEDICAL HISTORY:   Past Medical History:   Diagnosis Date    H  pylori infection     Hypertension     Obesity        PAST SURGICAL HISTORY:   Past Surgical History:   Procedure Laterality Date    ABDOMINAL SURGERY  2009    Tummy Tuck    DILATION AND CURETTAGE OF UTERUS      EGD      [place] LAP, DEMETRIUS RESTRICT PROC, LONGITUDINAL GASTRECTOMY N/A 5/18/2021    Procedure: LAPAROSCOPIC SLEEVE GASTRECTOMY WITH ROBOTICS;  Surgeon: Nancy Godoy MD;  Location: [facility];  Service: Reagan Lavender TUBAL LIGATION         FAMILY HISTORY:  Family History   Problem Relation Age of Onset    Hypertension Mother     Hyperlipidemia Mother     Diabetes Mother     Prostate cancer Father        SOCIAL HISTORY:  Social History     Tobacco Use   Smoking Status Former Smoker    Packs/day: 0 10    Years: 10 00    Pack years: 1 00    Quit date:     Years since quittin 3   Smokeless Tobacco Never Used   Tobacco Comment    stopped hooka 2021     Social History     Substance and Sexual Activity   Alcohol Use Not Currently    Binge frequency: Never    Comment: social      Social History     Substance and Sexual Activity   Drug Use No     ALLERGIES:  No Known Allergies    CURRENT HOSPITAL MEDICATIONS:     Current Facility-Administered Medications:     acetaminophen (TYLENOL) oral suspension 975 mg, 975 mg, Oral, Q8H, Gladys Soler PA-C, 975 mg at 21 0838    amLODIPine (NORVASC) tablet 5 mg, 5 mg, Oral, Daily, Tejinder Gonsalez DO, 5 mg at 21 0927    diphenhydrAMINE (BENADRYL) tablet 25 mg, 25 mg, Oral, Q8H PRN, Gladys Soler PA-C    enoxaparin (LOVENOX) subcutaneous injection 40 mg, 40 mg, Subcutaneous, Q24H BEATRIS, Gladys Sloer PA-C, 40 mg at 21 0927    famotidine (PEPCID) injection 20 mg, 20 mg, Intravenous, Q12H [address], Gladys Soler PA-C, 20 mg at 21 0838    hydrALAZINE (APRESOLINE) injection 10 mg, 10 mg, Intravenous, Q6H PRN, Jose Hoang MD, 10 mg at 21 0303    lactated ringers infusion, 75 mL/hr, Intravenous, Continuous, Gladys Soler PA-C, Last Rate: 75 mL/hr at 21, 75 mL/hr at 21    losartan (COZAAR) tablet 50 mg, 50 mg, Oral, Daily, Tejinder Gonsalez DO, 50 mg at 21 9041    metoclopramide (REGLAN) injection 10 mg, 10 mg, Intravenous, Q6H PRN, Gladys Soler PA-C    metoprolol (LOPRESSOR) injection 5 mg, 5 mg, Intravenous, Q6H PRN, Jose Hoang MD, 5 mg at 21 0117    morphine (PF) 4 mg/mL injection 4 mg, 4 mg, Intravenous, Q4H PRN, Gladys N Karlene, PA-C, 4 mg at 05/18/21 2215    ondansetron (ZOFRAN) injection 4 mg, 4 mg, Intravenous, Q6H PRN, Gladys N Karlene PA-C, 4 mg at 05/19/21 0455    oxyCODONE (ROXICODONE) oral solution 10 mg, 10 mg, Oral, Q4H PRN, Gladys N Karlene, PA-C, 10 mg at 05/18/21 2034    oxyCODONE (ROXICODONE) oral solution 5 mg, 5 mg, Oral, Q4H PRN, Gladys N Karlene, PA-C    phenol (CHLORASEPTIC) 1 4 % mucosal liquid 2 spray, 2 spray, Mouth/Throat, Q2H PRN, Gladys REGAN Soler PA-C    promethazine (PHENERGAN) injection 25 mg, 25 mg, Intravenous, Q6H PRN, Gladys REGAN Soler PA-C    simethicone (MYLICON) chewable tablet 80 mg, 80 mg, Oral, 4x Daily PRN, Gladys REGAN Soler PA-C      REVIEW OF SYMPTOMS:    Positive for:  Lightheadedness  Negative for: All remaining as reviewed below and in HPI  VITAL SIGNS:  Vitals:    05/19/21 0030 05/19/21 0242 05/19/21 0427 05/19/21 0753   BP: 155/95 165/86 132/82 133/83   Pulse: 77 72 80 (!) 42   Resp:    18   Temp:    97 7 °F (36 5 °C)   TempSrc:       SpO2: 96% 97% 98% 96%   Weight:       Height:             PHYSICAL EXAM:   Physical Exam  Vitals signs reviewed  Constitutional:       General: She is not in acute distress  Appearance: Normal appearance  She is ill-appearing  HENT:      Head: Normocephalic and atraumatic  Eyes:      Extraocular Movements: Extraocular movements intact  Pupils: Pupils are equal, round, and reactive to light  Cardiovascular:      Rate and Rhythm: Normal rate  Rhythm irregular  Pulses: Normal pulses  Heart sounds: No murmur  No gallop  Pulmonary:      Effort: Pulmonary effort is normal  No respiratory distress  Breath sounds: Normal breath sounds  No wheezing  Abdominal:      General: There is no distension  Palpations: Abdomen is soft  Tenderness: There is abdominal tenderness (At procedure site)  There is no guarding  Musculoskeletal:         General: No swelling or tenderness  Right lower leg: No edema  Left lower leg: No edema  Skin:     Capillary Refill: Capillary refill takes less than 2 seconds  Coloration: Skin is not jaundiced or pale  Neurological:      General: No focal deficit present  Mental Status: She is alert and oriented to person, place, and time  Psychiatric:         Mood and Affect: Mood normal          Behavior: Behavior normal              LABORATORY DATA:  I have personally reviewed pertinent labs  CMP:   Results from last 7 days   Lab Units 05/19/21  0510   SODIUM mmol/L 137   POTASSIUM mmol/L 3 8   CHLORIDE mmol/L 103   CO2 mmol/L 24   BUN mg/dL 6   CREATININE mg/dL 0 68   CALCIUM mg/dL 9 0       Cardiac Profile: No results found for: TROPONINI, CKMB, NTBNP, DIGOXIN    CBC:   Results from last 7 days   Lab Units 05/19/21  0510   WBC Thousand/uL 9 00   HEMOGLOBIN g/dL 13 4   HEMATOCRIT % 42 7   PLATELETS Thousands/uL 244       PT/INR: No results found for: PT, INR,     Magnesium:   Results from last 7 days   Lab Units 05/19/21  0510   MAGNESIUM mg/dL 2 3       Phosphorous:       Microbiology:              RADIOLOGY RESULTS:  No results found  CURRENT ECG:  Results for orders placed or performed during the hospital encounter of 05/18/21   ECG 12 lead   Result Value    Ventricular Rate 82    Atrial Rate 82    WV Interval 152    QRSD Interval 84    QT Interval 452    QTC Interval 528    P Axis 52    QRS Axis -8    T Wave Axis 30    Narrative    Sinus rhythm with frequent Premature ventricular complexes in a pattern of bigeminy  Prolonged QT  Abnormal ECG  When compared with ECG of 23-NOV-2017 05:26,  Premature ventricular complexes are now Present  Nonspecific T wave abnormality no longer evident in Anterior leads  QT has lengthened  Confirmed by Tamra Mcnamara ([id number]) on 5/19/2021 9:24:34 AM       ECHOCARDIOGRAM AND OTHER CARDIOLOGY RESULTS:  No results found for this or any previous visit  No results found for this or any previous visit    No results found for this or any previous visit  No results found for this or any previous visit  CARDIOLOGY ASSESSMENT & PLAN:    PVCs with bigeminy pattern  · Noted on tele post surgery, also evident on EKG from this morning, picture as shown above, with QTC prolongation, after discussion with attending cardiology patient's PVCs most probably LV OT in origin given its characteristics on EKG  · No apparent electrolyte abnormalities  · Patient denies any alcohol, substance abuse or caffeine consumption  · The patient prolonged QTC was probably secondary to medication side effects in the setting of clarithromycin use for 2 weeks, Phenergan and metoclopramide use  · The patient PVCs most probably secondary to surgical related stress and pain  · Echocardiogram ordered to rule out any structural cardiac abnormalities and evaluate cardiac status  · Will continue monitor on tele  · Troponin ordered for the sake of completion, very low suspicious for ACS  · Will order a TSH level  ·  avoid QTC prolongation causing agents like zofran and raglan   · Will hold patient losartan, will start Lopressor 12 5 mg, will further evaluate for regimen adjustment on discharge based on the rest of the workup results  Hypertension  · On amlodipine 5 mg daily, losartan 100 mg daily  · Will hold losartan, stone low-dose metoprolol 12 5 mg given current PVCs  · Closely monitor vital signs    Day 1 S/P laparoscopic sleeve gastrectomy  · Patient is lying in bed comfortable mild abdominal tenderness  · Continue management per primary team    Class 2 obesity Obesity  · BMI 37 27,S/P laparoscopic sleeve gastrectomy  · Continue management per primary team    Mild obstructive sleep apnea:  · Diagnosed by sleep study 2020  · Patient does not use CPAP    SIGNATURES:     Cristal Apple MD     CC:   Yvonne Samaniego MD   No ref   provider found

## 2021-05-19 NOTE — UTILIZATION REVIEW
Initial Clinical Review    Elective   IP surgical procedure  Age/Sex: 41 y o  female  Surgery Date:  5/18   Procedure:  LAPAROSCOPIC SLEEVE GASTRECTOMY WITH ROBOTICS  Anesthesia:  General   Operative Findings:  Normal   5/18  POSTOP :   Prn IV MS x2 ,  IV zofran x4,  Po roxicodone x2      POD#1 Progress Note: Pain adequately controlled on oral pain medication  Ambulating without assistance, voiding well, and using incentive spirometer  Tolerating liquid diet without nausea or vomiting today  SLIM Consulted yesterday for HTN med management        Cardiac consult  for new bigeminy (pt asymptomatic)  not present on admission or preop cardiac eval          5/19  INT MED:  restart amlodipine and losartan but at 50 mg instead of 100 mg        Admission Orders: Date/Time/Statement:    Admission Orders (From admission, onward)     Ordered        05/18/21 0852  Inpatient Admission  Once                   Orders Placed This Encounter   Procedures    Inpatient Admission     Standing Status:   Standing     Number of Occurrences:   1     Order Specific Question:   Level of Care     Answer:   Med Surg [16]     Order Specific Question:   Estimated length of stay     Answer:   Inpatient Only Surgery     Vital Signs: /83   Pulse (!) 42   Temp 97 7 °F (36 5 °C)   Resp 18   Ht 5' 4" (1 626 m)   Wt 98 5 kg (217 lb 2 5 oz)   LMP 05/03/2021   SpO2 96%   BMI 37 27 kg/m²     Pertinent Labs/Diagnostic Test Results:   5/19  ekg -  Sinus rhythm with frequent Premature ventricular complexes in a pattern of bigeminy  Prolonged QT        Results from last 7 days   Lab Units 05/19/21  0510   WBC Thousand/uL 9 00   HEMOGLOBIN g/dL 13 4   HEMATOCRIT % 42 7   PLATELETS Thousands/uL 244         Results from last 7 days   Lab Units 05/19/21  0510   SODIUM mmol/L 137   POTASSIUM mmol/L 3 8   CHLORIDE mmol/L 103   CO2 mmol/L 24   ANION GAP mmol/L 10   BUN mg/dL 6   CREATININE mg/dL 0 68   EGFR ml/min/1 73sq m 108   CALCIUM mg/dL 9 0 MAGNESIUM mg/dL 2 3             Results from last 7 days   Lab Units 05/19/21  0510   GLUCOSE RANDOM mg/dL 109     Diet: bariatric liquids  Mobility: ambulate q shift   DVT Prophylaxis:  scd's and  lovenox sq     Medications/Pain Control:     Scheduled Medications:  acetaminophen, 975 mg, Oral, Q8H  amLODIPine, 5 mg, Oral, Daily  enoxaparin, 40 mg, Subcutaneous, Q24H BEATRIS  famotidine, 20 mg, Intravenous, Q12H BEATRIS  losartan, 50 mg, Oral, Daily      Continuous IV Infusions:  lactated ringers, 75 mL/hr, Intravenous, Continuous      PRN Meds:  diphenhydrAMINE, 25 mg, Oral, Q8H PRN  hydrALAZINE, 10 mg, Intravenous, Q6H PRN  metoclopramide, 10 mg, Intravenous, Q6H PRN  metoprolol, 5 mg, Intravenous, Q6H PRN  morphine injection, 4 mg, Intravenous, Q4H PRN  ondansetron, 4 mg, Intravenous, Q6H PRN  oxyCODONE, 10 mg, Oral, Q4H PRN  oxyCODONE, 5 mg, Oral, Q4H PRN  phenol, 2 spray, Mouth/Throat, Q2H PRN  promethazine, 25 mg, Intravenous, Q6H PRN  simethicone, 80 mg, Oral, 4x Daily PRN        Network Utilization Review Department  ATTENTION: Please call with any questions or concerns to [phone] and carefully listen to the prompts so that you are directed to the right person  All voicemails are confidential   Cleotis Dirk all requests for admission clinical reviews, approved or denied determinations and any other requests to dedicated fax number below belonging to the campus where the patient is receiving treatment   List of dedicated fax numbers for the Facilities:  [address] DENIALS (Administrative/Medical Necessity) [phone]   [address] [place] [address] (Maternity/NICU/Pediatrics) 261 [facility],[address] Ben Delgado 5827 [id number] [facility]ep [facility] Salas [address] [phone]

## 2021-05-19 NOTE — UTILIZATION REVIEW
Inpatient Admission Authorization Request   NOTIFICATION OF INPATIENT ADMISSION/INPATIENT AUTHORIZATION REQUEST   SERVICING FACILITY:   [address], [facility], [facility]  Tax ID: [id number]  NPI: [id number]  Place of Service: Inpatient [address] [facility]  [address]  Place of Service Code: 24     ATTENDING PROVIDER:  Attending Name and NPI#: Romy Chachaedy, Glenn Veronica [[id number]]  Address: [address] [place] [address], [facility], [facility]  Phone: [phone]     UTILIZATION REVIEW CONTACT:  Mariam Carter, Utilization   Network Utilization Review Department  Phone: [phone]  Fax: [phone]  Email: [email or web address]     PHYSICIAN ADVISORY SERVICES:  FOR [id number] REVIEW - MEDICAL NECESSITY DENIAL  Phone: [phone]  Fax: [phone]  Email: [email or web address]     TYPE OF REQUEST:  Inpatient Status     ADMISSION INFORMATION:  ADMISSION DATE/TIME: 5/18/21 0852  PATIENT DIAGNOSIS CODE/DESCRIPTION:  Severe obesity (BMI 35 0-39  9) with comorbidity ([facility] [address] ) [E66 01]  Benign hypertension [I10]  Snoring [R06 83]  DISCHARGE DATE/TIME: No discharge date for patient encounter  DISCHARGE DISPOSITION (IF DISCHARGED): Home/Self Care     IMPORTANT INFORMATION:  Please contact the Ros Darnell directly with any questions or concerns regarding this request  Department voicemails are confidential     Send requests for admission clinical reviews, concurrent reviews, approvals, and administrative denials due to lack of clinical to fax [phone]

## 2021-05-19 NOTE — DISCHARGE INSTRUCTIONS
Bariatric/Weight Loss Surgery  Hospital Discharge Instructions  1  ACTIVITY:  a  Progress as feels comfortable - a good rule is:  if you are doing something and it begins to hurt, stop doing the activity  Walk every hour while at home  b  Karina Terrell may walk stairs if you do so slowly  c  You may shower 48 hours after surgery  d  Use your incentive spirometer 10 times per hour while awake for 1 week  e  Do NOT drive for 48 hours after surgery  No driving 24 hours after taking certain prescription pain medications   Examples of such medication are Percocet, Darvocet, Oxycodone, Tylenol #3, and Tylenol with Codeine  2  DIET  a  Stay on a liquid diet for 7 days after your surgery date, sipping slowly  Refer to your manual for examples of choices  Remember to keep your liquids sugar free or low calorie  You may have protein drinks  Make sure to drink 48 to 64 ounces per day of fluids  b  Karina Terrell may advance to a pureed diet one week after surgery as instructed by your diet progression pamphlet  Once you get approval from your surgeon at your first post operative visit you may advance to the soft diet  3  MEDICATIONS:  a  The abdominal nerve block will wear off during the first 1-2 days that you are home, and you may become sore  Continue to take your Tylenol and your pain medication as instructed  b  Start vitamins and minerals when you get home  c  Anti-acid Medication as per prescription  d  Other medications as indicated on the Physician Patient Discharge Instructions form given to you at the time of discharge  e  Make sure that you are splitting your pill or tablet medications in halves or fourths or even crushing them before you take them  Capsules should be opened and mixed with water or jello  You need to do this for at least 4 weeks after surgery  Eventually you will be able to take your medications the regular way as they were prescribed     f  Karina Terrell will need to consult with your Family Doctor in regards to all your prescribed medication, particularly those for blood pressure and diabetes  As you lose weight, medical conditions may change, requiring an alteration or elimination of the drug dose  g  DO NOT TAKE BIRTH CONTROL(BC) MEDICATIONS, INSERT BC VAGINAL RINGS, OR PLACE IUD OR ANY OTHER BC METHODS UNTIL 31 DAYS FROM DAY OF DISCHARGE FROM HOSPITAL  THIS PLACES YOU AT HIGH RISK FOR A POTENTIALLY LIFE THREATENING BLOOD CLOT  Remember to always use barrier methods for birth control and speak to your GYN about using two forms of birth control to start 31 days after surgery  It is very important to avoid pregnancy until at least 18-24 months after surgery  h  Take losartan 50 mg daily in the morning and metoprolol succinate 12 5 mg in the morning and amlodipine 5 mg in the afternoon/evening  4  INCISION CARE  a  You may shower and get incisions wet 2 days after surgery  No soaking tub baths or swimming for 30 days after surgery  Keep abdominal area and incisions clean  Use soap and water to create a good lather and rinse off  Do not scrub incisions  b  If you have a drain, empty the drain as the nurses instructed  5  FOLLOW-UP APPOINTMENT should be made for one week after discharge  Call surgeons office at [phone] to schedule an appointment  Follow up with your cardiologist in one week      6  CALL YOUR DOCTOR FOR:  pain not controlled by pain medications, a temperature greater than 101 5° F, any increase or change in drainage or redness from any incision, any vomiting or inability to keep liquids down, shortness of breath, shoulder pain, or bleeding

## 2021-05-19 NOTE — NURSING NOTE
Pt in bigeminy heart rhythm  Pt a symptomatic  Allen AP made aware, asked to inform SLIM, made floor call aware  Passed on to day RN

## 2021-05-19 NOTE — DISCHARGE SUMMARY
Discharge Summary - Christian Morales 41 y o  female MRN: [id number]    Unit/Bed#: E5 -01 Encounter: [id number]      Pre-Operative Diagnosis: Pre-Op Diagnosis Codes: * Severe obesity (BMI 35 0-39  9) with comorbidity ([address] ) [E66 01]     * Benign hypertension [I10]     * Snoring [R06 83]    Post-Operative Diagnosis: Post-Op Diagnosis Codes: * Severe obesity (BMI 35 0-39  9) with comorbidity ([address] ) [E66 01]     * Benign hypertension [I10]     * Snoring [R06 83]    Procedures Performed:  Procedure(s):  LAPAROSCOPIC SLEEVE GASTRECTOMY WITH ROBOTICS    Surgeon: Dorcas Navarro MD    See H & P for full details of admission and Operative Note for full details of operations performed  Patient tolerated surgery well without complications  In the morning postoperative Day 1, the patient had mild nausea and abdominal pain  Tolerated a clear liquid diet without vomiting  Able to ambulate and voiding independently  Patient had some PVCs and Trigeminy on EKG  Cardiology was consulted and an echo was done  They adjusted her medications and cleared her for discharge  Patient was deemed ready for discharge home  Patient was seen and examined prior to discharge  Provisions for Follow-Up Care:  See After Visit Summary/Discharge Instructions for information related to follow-up care and home orders  Disposition: Home, in stable condition  Planned Readmission: No    Discharge Medications:  See After Visit Summary/Discharge Instructions for reconciled discharge medications provided to patient and family  Post Operative instructions: Reviewed with patient and/or family      Signature:   Rama Slater MD  Date: 5/19/2021 Time: 4:02 PM

## 2021-05-19 NOTE — DISCHARGE INSTR - AVS FIRST PAGE
your pain medications from [address] in [address]   Take Tylenol as instructed  Stay hydrated and follow your discharge diet progression   Mild nausea is ok as long as you can drink fluids  Take your omeprazole daily  Crush or cut your pills and open capsules, mix with liquid to drink  Take losartan 50 mg daily in the morning and metoprolol succinate 12 5 mg in the morning and amlodipine 5 mg in the afternoon/evening      Follow up with Dr Jennifer Eddy and your PCP and cardiologist within the next week

## 2021-05-19 NOTE — NURSING NOTE
Patient's IV was removed  Patient verbalized understanding of discharge instructions  Patient verbalized understanding of postoperative care instructions  Patient picked up medications from [address] [place] [address] Rody Patino  Patient was ambulatory at discharge and left with daughter

## 2021-05-19 NOTE — PROGRESS NOTES
Progress Note - Bariatric Surgery   Israel Cash 41 y o  female MRN: [id number]  Unit/Bed#: E5 -01 Encounter: [id number]      Subjective/Objective     Subjective:  Patient with morbid obesity POD1 s/p Robotic sleeve gastrectomy  Patient denies fevers, chills, sweats, SOB, CP, calf pain  Pain adequately controlled on oral pain medication  Ambulating without assistance, voiding well, and using incentive spirometer  Tolerating liquid diet without nausea or vomiting today  Vital signs stable except for new bigeminy on telemetry and mild HTN  CBC today shows Hgb stable at 13 4 from 13 previously  BMP obtained today WNL  No hx of CPAP  SLIM Consulted yesterday for HTN med management and saw patient this morning  Cardiac consult recommended per SLIM for new bigeminy not present on admission or preop cardiac eval   Hx positive H  Pylori  Objective:    /83   Pulse (!) 42   Temp 97 7 °F (36 5 °C)   Resp 18   Ht 5' 4" (1 626 m)   Wt 98 5 kg (217 lb 2 5 oz)   LMP 05/03/2021   SpO2 96%   BMI 37 27 kg/m²       Intake/Output Summary (Last 24 hours) at 5/19/2021 0946  Last data filed at 5/18/2021 2037  Gross per 24 hour   Intake 1950 ml   Output 1150 ml   Net 800 ml       Invasive Devices     Peripheral Intravenous Line            Peripheral IV 05/18/21 Left Forearm 1 day                ROS: 10-point system completed  All negative except see HPI      Physical Exam    General Appearance:    Alert, cooperative, no distress, appears stated age   Head:    Normocephalic, without obvious abnormality, atraumatic   Lungs:     Respirations unlabored   Heart:    Regular rate and rhythm   Abdomen:     Soft, appropriate tenderness, no masses, no organomegaly, non-distended   Extremities:   Extremities normal, atraumatic, no cyanosis or edema   Neurologic:  Incision:  Psych:   Normal strength and sensation    Clean, dry, and intact, no bleeding    Normal mood and affect       Lab, Imaging and other studies:  CBC:   Lab Results   Component Value Date    WBC 9 00 05/19/2021    HGB 13 4 05/19/2021    HCT 42 7 05/19/2021     (H) 05/19/2021     05/19/2021    MCH 32 4 05/19/2021    MCHC 31 4 05/19/2021    RDW 15 0 05/19/2021    MPV 11 5 05/19/2021   , CMP:   Lab Results   Component Value Date    SODIUM 137 05/19/2021    K 3 8 05/19/2021     05/19/2021    CO2 24 05/19/2021    BUN 6 05/19/2021    CREATININE 0 68 05/19/2021    CALCIUM 9 0 05/19/2021    EGFR 108 05/19/2021        VTE Mechanical Prophylaxis: sequential compression device    Assessment/Plan  1)  Patient with morbid Obesity s/p Robotic Sleeve Gastrectomy with stable post op course  Patient afebrile and hemodynamically stable  New bigeminy found by SLIM on telemetry  Patient asymptomatic     - Cardiac consult for new bigeminy on telemetry  - Monitor heart rate and BP  - Encourage PO fluids  - Recommend ambulation, use of SCDs when not ambulating, and incentive spirometry    - Ok to give Lovenox (told nurse Litzy)  - No need to repeat labs  - Plan to possibly D/C patient home today pending anticipated progression and cardiac recommendations    Plan of care was discussed with patient  Care plan discussed with Dr Neelam Soler, PA-C  Bariatric Surgery  5/19/2021  9:46 AM

## 2021-05-19 NOTE — CONSULTS
Please cardiology consultation note initiated by resident Dr Nohemi Castellano at 05/19/21 10:41 a norma Keene MD; [facility]

## 2021-05-19 NOTE — ASSESSMENT & PLAN NOTE
· Evidence of bigeminy on telemetry  Patient asymptomatic  · Last ECG reviewed was 2017  Patient did see cardiology preoperatively last month  · Recommend cardiology re-evaluation    Electrolytes within limits

## 2021-05-19 NOTE — QUICK NOTE
Reviewed echocardiogram findings which indicated normal left ventricular systolic function and no significant diastolic dysfunction  There is evidence of biatrial enlargement trace mitral tricuspid and pulmonic valve regurgitation  Spoke to patient and she is requesting discharge to home as she is feeling fine  Telemetry shows sinus rhythm with intermittent he is PVCs many of which are in trigeminy pattern  Advised patient the that given her surgery yesterday and nausea this morning and presence of PVCs may be it is advisable to keep her 1 more night however she prefers to be discharged to home  From a cardiac perspective patient may be discharged home  She may be discharged on losartan 50 mg daily in the morning and metoprolol succinate 12 5 mg in the morning and amlodipine 5 mg in the afternoon/evening  We will make arrangements for follow-up with her cardiologist Dr Michael Spivey  She will need a outpatient Holter monitor which will be arranged      Rose Hartman MD

## 2021-05-19 NOTE — CONSULTS
Divyashayla 75 1980, 41 y o  female MRN: [id number]  Unit/Bed#: E5 -01 Encounter: [id number]  Primary Care Provider: Christopher Osborn MD   Date and time admitted to hospital: 5/18/2021  5:03 AM    Inpatient consult to Internal Medicine  Consult performed by: Shola Ashley DO  Consult ordered by: Baljit Meehan PA-C      ASSESSMENT AND PLAN  * Obesity, Class II, BMI 35-39 9  Assessment & Plan  · Obesity not morbid status post sleeve gastrectomy  · Recovering relatively well    Bigeminy  Assessment & Plan  · Evidence of bigeminy on telemetry  Patient asymptomatic  · Last ECG reviewed was 2017  Patient did see cardiology preoperatively last month  · Recommend cardiology re-evaluation  Electrolytes within limits    HTN (hypertension)  Assessment & Plan  · Blood pressures elevated  Will restart amlodipine and losartan but at 50 mg instead of 100 mg    Recommendations communicated to bariatric service  HPI: Andrei Dela Cruz is a 41y o  year old female with history of hypertension and H pylori who presents for sleeve gastrectomy  Postoperatively she is doing well  She has minimal nausea but no chest pain or shortness of breath  On telemetry she was noted to have bigeminy not present prior  Her blood pressures are elevated  She did see cardiology last month preoperatively  She states that she is otherwise healthy  ALLERGIES  No Known Allergies  HOME MEDICATIONS    Prior to Admission Medications   Prescriptions Last Dose Informant Patient Reported? Taking?    Multiple Vitamin (multivitamin) tablet 5/17/2021 at Unknown time  Yes Yes   Sig: Take 1 tablet by mouth daily   VITAMIN D, CHOLECALCIFEROL, PO 5/17/2021 at Unknown time  Yes Yes   Sig: Take by mouth daily   amLODIPine (NORVASC) 5 mg tablet 5/17/2021 at 76046 Self Yes Yes   Sig: Take 5 mg by mouth daily   amoxicillin (AMOXIL) 500 MG tablet 5/17/2021 at 2100  Yes Yes   Sig: Take 500 mg by mouth 2 (two) times a day   clarithromycin (BIAXIN XL) 500 MG 24 hr tablet 5/17/2021 at 1200  Yes Yes   Sig: Take 1,000 mg by mouth daily   enoxaparin (LOVENOX) 40 mg/0 4 mL   No No   Sig: Inject 0 4 mL (40 mg total) under the skin daily for 13 days   losartan (COZAAR) 100 MG tablet 5/17/2021 at 2100 Self Yes Yes   Sig: Take 100 mg by mouth daily   omeprazole (PriLOSEC) 20 mg delayed release capsule 5/17/2021 at 1200  No Yes   Sig: Take 1 capsule (20 mg total) by mouth daily   oxyCODONE (ROXICODONE) 5 mg immediate release tablet   No No   Sig: Take 1 tablet (5 mg total) by mouth every 4 (four) hours as needed for moderate painMax Daily Amount: 30 mg      Facility-Administered Medications: None     CURRENT MEDICATIONS  Current Facility-Administered Medications   Medication Dose Route Frequency Provider Last Rate Last Admin    acetaminophen (TYLENOL) oral suspension 975 mg  975 mg Oral Q8H Gladys Soler PA-C   975 mg at 05/19/21 0838    diphenhydrAMINE (BENADRYL) tablet 25 mg  25 mg Oral Q8H PRN Gladys Soler PA-C        enoxaparin (LOVENOX) subcutaneous injection 40 mg  40 mg Subcutaneous Q24H [address] Gladys REGAN Soler PA-C        famotidine (PEPCID) injection 20 mg  20 mg Intravenous Q12H [address] Gladys Soler PA-C   20 mg at 05/19/21 0838    hydrALAZINE (APRESOLINE) injection 10 mg  10 mg Intravenous Q6H PRN Alison Padron MD   10 mg at 05/19/21 0303    lactated ringers infusion  75 mL/hr Intravenous Continuous Gladys Soler PA-C 75 mL/hr at 05/18/21 2037 75 mL/hr at 05/18/21 2037    metoclopramide (REGLAN) injection 10 mg  10 mg Intravenous Q6H PRN Gladys Soler PA-C        metoprolol (LOPRESSOR) injection 5 mg  5 mg Intravenous Q6H PRN Alison Padron MD   5 mg at 05/19/21 0117    morphine (PF) 4 mg/mL injection 4 mg  4 mg Intravenous Q4H PRN Gladys Soler PA-C   4 mg at 05/18/21 0789    ondansetron (ZOFRAN) injection 4 mg  4 mg Intravenous Q6H PRN Gladys Soler PA-C   4 mg at 05/19/21 1740    oxyCODONE (ROXICODONE) oral solution 10 mg  10 mg Oral Q4H PRN Gladys REGAN Soler PA-C   10 mg at 21    oxyCODONE (ROXICODONE) oral solution 5 mg  5 mg Oral Q4H PRN Gladys REGAN Soler PA-C        phenol (CHLORASEPTIC) 1 4 % mucosal liquid 2 spray  2 spray Mouth/Throat Q2H PRN Gladys REGAN Soler PA-C        promethazine (PHENERGAN) injection 25 mg  25 mg Intravenous Q6H PRN Gladys REGAN Soler PA-C        simethicone (MYLICON) chewable tablet 80 mg  80 mg Oral 4x Daily PRN Gladys REGAN Soler PA-C         PAST HISTORY  Past Medical History:   Diagnosis Date    H  pylori infection     Hypertension     Obesity      Past Surgical History:   Procedure Laterality Date    ABDOMINAL SURGERY      Tummy Tuck    DILATION AND CURETTAGE OF UTERUS      EGD      [place] LAP, DEMETRIUS RESTRICT PROC, LONGITUDINAL GASTRECTOMY N/A 2021    Procedure: LAPAROSCOPIC SLEEVE GASTRECTOMY WITH ROBOTICS;  Surgeon: Opal Schroeder MD;  Location: [facility] OR;  Service: Bariatrics    TUBAL LIGATION       SOCIAL HISTORY  Social History     Socioeconomic History    Marital status: Single     Spouse name: Not on file    Number of children: Not on file    Years of education: Not on file    Highest education level: Not on file   Occupational History    Not on file   Social Needs    Financial resource strain: Not on file    Food insecurity     Worry: Not on file     Inability: Not on file    Transportation needs     Medical: Not on file     Non-medical: Not on file   Tobacco Use    Smoking status: Former Smoker     Packs/day: 0 10     Years: 10 00     Pack years: 1 00     Quit date:      Years since quittin 3    Smokeless tobacco: Never Used    Tobacco comment: stopped hooka 2021   Substance and Sexual Activity    Alcohol use: Not Currently     Binge frequency: Never     Comment: social     Drug use: No    Sexual activity: Not Currently   Lifestyle    Physical activity     Days per week: Not on file     Minutes per session: Not on file    Stress: Not on file   Relationships    Social connections     Talks on phone: Not on file     Gets together: Not on file     Attends Druze service: Not on file     Active member of club or organization: Not on file     Attends meetings of clubs or organizations: Not on file     Relationship status: Not on file    Intimate partner violence     Fear of current or ex partner: Not on file     Emotionally abused: Not on file     Physically abused: Not on file     Forced sexual activity: Not on file   Other Topics Concern    Not on file   Social History Narrative    Not on file     FAMILY HISTORY  Family History   Problem Relation Age of Onset    Hypertension Mother     Hyperlipidemia Mother     Diabetes Mother     Prostate cancer Father        REVIEW OF SYSTEMS  Review of Systems   Constitutional: Negative for chills, diaphoresis and fever  HENT: Negative for dental problem and facial swelling  Eyes: Negative for photophobia, pain and visual disturbance  Respiratory: Negative for shortness of breath, wheezing and stridor  Cardiovascular: Negative for chest pain and palpitations  Gastrointestinal: Positive for abdominal distention and nausea  Negative for abdominal pain, diarrhea and vomiting  Genitourinary: Negative for dysuria, hematuria and urgency  Musculoskeletal: Negative for back pain and myalgias  Skin: Negative for rash  Neurological: Negative for dizziness, seizures, speech difficulty, numbness and headaches  Psychiatric/Behavioral: Negative for agitation and suicidal ideas  The patient is not nervous/anxious  All other systems reviewed and are negative  OBJECTIVE  Temp:  [97 5 °F (36 4 °C)-98 5 °F (36 9 °C)] 97 7 °F (36 5 °C)  HR:  [42-89] 42  Resp:  [16-22] 18  BP: (132-170)/() 133/83    PHYSICAL EXAM  Physical Exam  Vitals signs reviewed  Constitutional:       General: She is not in acute distress  Appearance: Normal appearance     HENT: Head: Atraumatic  Mouth/Throat:      Mouth: Mucous membranes are moist    Eyes:      General: No scleral icterus  Extraocular Movements: Extraocular movements intact  Cardiovascular:      Rate and Rhythm: Regular rhythm  Heart sounds: Normal heart sounds  Comments: + ectopy  Pulmonary:      Breath sounds: Normal breath sounds  No wheezing  Abdominal:      General: Bowel sounds are normal       Palpations: Abdomen is soft  Tenderness: There is no guarding or rebound  Musculoskeletal:         General: No swelling  Skin:     General: Skin is warm  Comments: Abdominal incisions intact   Neurological:      Mental Status: She is alert and oriented to person, place, and time  Mental status is at baseline  Psychiatric:         Mood and Affect: Mood normal          Lab Results: I have personally reviewed pertinent reports  Labs:  Results from last 7 days   Lab Units 05/19/21  0510   WBC Thousand/uL 9 00   HEMOGLOBIN g/dL 13 4   HEMATOCRIT % 42 7   MCV fL 103*   PLATELETS Thousands/uL 244           Results from last 7 days   Lab Units 05/19/21  0510   SODIUM mmol/L 137   POTASSIUM mmol/L 3 8   CHLORIDE mmol/L 103   CO2 mmol/L 24   ANION GAP mmol/L 10   BUN mg/dL 6   CREATININE mg/dL 0 68   CALCIUM mg/dL 9 0   EGFR ml/min/1 73sq m 108   GLUCOSE RANDOM mg/dL 109       Imaging:   No results found  Total Time for Visit, including Counseling / Coordination of Care: x mins  Greater than 50% of this total time spent on direct patient counseling and coordination of care  ** Please Note: This note has been constructed using a voice recognition system   **

## 2021-05-20 ENCOUNTER — TELEPHONE (OUTPATIENT)
Dept: MEDSURG UNIT | Facility: HOSPITAL | Age: 41
End: 2021-05-20

## 2021-05-20 NOTE — TELEPHONE ENCOUNTER
Post op follow up phone call completed  Pt is sipping liquids, using IS as instructed, reinforced importance of using IS to help prevent pneumonia  Ambulating about home without difficulty  Pain controlled with analgesia but hasn't needed anything since last evening  Reaffirmed examples of liquid diet over the next week  Pt stated understanding about discharge instructions and medication adjustments  Tolerating self administration of Lovenox injections without difficulty  Follow up appt with surgeon scheduled for next week  Instructed to call with any additional questions or concerns

## 2021-05-28 ENCOUNTER — OFFICE VISIT (OUTPATIENT)
Dept: BARIATRICS | Facility: CLINIC | Age: 41
End: 2021-05-28

## 2021-05-28 VITALS
SYSTOLIC BLOOD PRESSURE: 120 MMHG | BODY MASS INDEX: 34.57 KG/M2 | TEMPERATURE: 98.6 F | HEIGHT: 64 IN | HEART RATE: 60 BPM | WEIGHT: 202.5 LBS | DIASTOLIC BLOOD PRESSURE: 70 MMHG

## 2021-05-28 DIAGNOSIS — E66.9 OBESITY, CLASS I, BMI 30-34.9: Primary | ICD-10-CM

## 2021-05-28 DIAGNOSIS — Z98.84 BARIATRIC SURGERY STATUS: ICD-10-CM

## 2021-05-28 PROCEDURE — 99024 POSTOP FOLLOW-UP VISIT: CPT | Performed by: SURGERY

## 2021-05-28 NOTE — PROGRESS NOTES
POST OP UP VISIT - BARIATRIC SURGERY  Adelaida Bermeo 41 y o  female MRN: [id number]  Unit/Bed#:  Encounter: [id number]      HPI:  Adelaida Bermeo is a 41 y o  female status post Robotic sleeve gastrectomy performed by Dr Florentino Favre on 5/18/21 returning to office for first post op visit since surgery  Tolerating puree diet without nausea or vomiting  Taking omeprazole, multivitamins, and lovenox injections  Mild itching around incision sites  Review of Systems   Constitutional: Negative for chills and fever  HENT: Negative for ear pain and sore throat  Eyes: Negative for pain and visual disturbance  Respiratory: Negative for cough and shortness of breath  Cardiovascular: Negative for chest pain and palpitations  Gastrointestinal: Negative for abdominal pain and vomiting  Genitourinary: Negative for dysuria and hematuria  Musculoskeletal: Negative for arthralgias and back pain  Skin: Negative for color change and rash  Neurological: Negative for seizures and syncope  All other systems reviewed and are negative        Historical Information   Past Medical History:   Diagnosis Date    Bariatric surgery status     H  pylori infection     Hypertension     Obesity     Postsurgical malabsorption      Past Surgical History:   Procedure Laterality Date    ABDOMINAL SURGERY  2009    Tummy Tuck    DILATION AND CURETTAGE OF UTERUS      EGD      [place] LAP, DEMETRIUS RESTRICT PROC, LONGITUDINAL GASTRECTOMY N/A 5/18/2021    Procedure: LAPAROSCOPIC SLEEVE GASTRECTOMY WITH ROBOTICS;  Surgeon: Del Watson MD;  Location: [facility];  Service: Bariatrics    TUBAL LIGATION       Social History   Social History     Substance and Sexual Activity   Alcohol Use Not Currently    Binge frequency: Never    Comment: social      Social History     Substance and Sexual Activity   Drug Use No     Social History     Tobacco Use   Smoking Status Former Smoker    Packs/day: 0 10    Years: 10 00    Pack years: 1 00    Quit date:     Years since quittin 4   Smokeless Tobacco Never Used   Tobacco Comment    stopped hooka 2021     Family History: non-contributory    Meds/Allergies   all medications and allergies reviewed  No Known Allergies    Objective       Current Vitals:   Blood Pressure: 120/70 (21 1059)  Pulse: 60 (21 1059)  Temperature: 98 6 °F (37 °C) (21 1059)  Height: 5' 4" (162 6 cm) (21 1059)  Weight - Scale: 91 9 kg (202 lb 8 oz) (21 1059)      Invasive Devices     None                 Physical Exam  Constitutional:       Appearance: Normal appearance  She is obese  HENT:      Head: Normocephalic and atraumatic  Nose: Nose normal       Mouth/Throat:      Mouth: Mucous membranes are moist    Eyes:      Extraocular Movements: Extraocular movements intact  Pupils: Pupils are equal, round, and reactive to light  Neck:      Musculoskeletal: Normal range of motion  Cardiovascular:      Rate and Rhythm: Normal rate and regular rhythm  Pulses: Normal pulses  Heart sounds: Normal heart sounds  Pulmonary:      Effort: Pulmonary effort is normal       Breath sounds: Normal breath sounds  Abdominal:      Palpations: Abdomen is soft  Comments: Incisions healing well, no signs of infection   Skin:     General: Skin is warm  Neurological:      General: No focal deficit present  Mental Status: She is alert and oriented to person, place, and time  Psychiatric:         Mood and Affect: Mood normal          Behavior: Behavior normal              Assessment/PLAN:    41 y o  female status post Robotic sleeve gastrectomy done on 21 by Dr Sharyle Smiling, doing well post op  No major issues and healing well  The pathology report was reviewed with the patient and the results were within normal limits  Pathology, and Other Studies: I have personally reviewed pertinent reports  Final Diagnosis   A   Stomach, sleeve gastrectomy:  - Segment of full-thickness stomach with mild chronic inactive gastritis  - Negative for intestinal metaplasia, dysplasia or carcinoma  - No Helicobacter pylori is identified on H&E stained slide  Increase physical activity slowly as tolerated and instructed  Advance diet as instructed by our dietitians today and as indicated in the binder  Continue Lovenox prophylaxis until completed  May use hydrocortisone cream around incision sites to help with itching, healing well  Follow up in one month as scheduled          Claudette Aj PA-C  Bariatric Surgery   5/28/2021  11:08 AM

## 2021-06-03 ENCOUNTER — TELEPHONE (OUTPATIENT)
Dept: BARIATRICS | Facility: CLINIC | Age: 41
End: 2021-06-03

## 2021-06-03 ENCOUNTER — OFFICE VISIT (OUTPATIENT)
Dept: CARDIOLOGY CLINIC | Facility: CLINIC | Age: 41
End: 2021-06-03
Payer: COMMERCIAL

## 2021-06-03 VITALS
WEIGHT: 201.3 LBS | DIASTOLIC BLOOD PRESSURE: 60 MMHG | HEIGHT: 64 IN | SYSTOLIC BLOOD PRESSURE: 112 MMHG | HEART RATE: 62 BPM | OXYGEN SATURATION: 96 % | BODY MASS INDEX: 34.37 KG/M2

## 2021-06-03 DIAGNOSIS — I49.3 PVC (PREMATURE VENTRICULAR CONTRACTION): Primary | ICD-10-CM

## 2021-06-03 DIAGNOSIS — I10 ESSENTIAL HYPERTENSION: ICD-10-CM

## 2021-06-03 PROCEDURE — 99215 OFFICE O/P EST HI 40 MIN: CPT | Performed by: NURSE PRACTITIONER

## 2021-06-03 PROCEDURE — 93000 ELECTROCARDIOGRAM COMPLETE: CPT | Performed by: NURSE PRACTITIONER

## 2021-06-03 NOTE — PROGRESS NOTES
Cardiology Follow Up    Mook Ramos  1980  [id number]  [facility] [address] [id number] [phone]    PVC     Interval History:   Ms Mook Ramos was admitted to [place] [address] on 5/18 - 5/19/21 with obesity  She was [facility] admitted and underwent laparoscopic sleeve gastrectomy with robotics by Dr Cesar Robb  She tolerated the surgery well  EKG showed trigeminy and PVCs  Cardiology was consulted  TTE showed  Normal left ventricular systolic function, LVEF 84%, left atrium mildly enlarged, right atrium mildly enlarged, trace MR, trace TR  She was started on metoprolol succinate 12 5 mg daily  Cardiology advised OP Holter monitor  Ms Mook Ramos presents to our office for a recent hospitalization follow up visit  She denies dyspnea with minimal moderate exertion chest pain palpitations lightheadedness or dizziness      HPI:  Obesity  Hypertension  Patient Active Problem List   Diagnosis    HTN (hypertension)    Obesity, Class II, BMI 35-39 9    MARSHALL (obstructive sleep apnea)    Preop cardiovascular exam    PONV (postoperative nausea and vomiting)    Bigeminy     Past Medical History:   Diagnosis Date    Bariatric surgery status     H  pylori infection     Hypertension     Obesity     Postsurgical malabsorption      Social History     Socioeconomic History    Marital status: Single     Spouse name: Not on file    Number of children: Not on file    Years of education: Not on file    Highest education level: Not on file   Occupational History    Not on file   Social Needs    Financial resource strain: Not on file    Food insecurity     Worry: Not on file     Inability: Not on file    Transportation needs     Medical: Not on file     Non-medical: Not on file   Tobacco Use    Smoking status: Former Smoker     Packs/day: 0 10     Years: 10 00 Pack years: 1 00     Quit date:      Years since quittin 4    Smokeless tobacco: Never Used    Tobacco comment: stopped hooka 2021   Substance and Sexual Activity    Alcohol use: Not Currently     Binge frequency: Never     Comment: social     Drug use: No    Sexual activity: Not Currently   Lifestyle    Physical activity     Days per week: Not on file     Minutes per session: Not on file    Stress: Not on file   Relationships    Social connections     Talks on phone: Not on file     Gets together: Not on file     Attends Zoroastrianism service: Not on file     Active member of club or organization: Not on file     Attends meetings of clubs or organizations: Not on file     Relationship status: Not on file    Intimate partner violence     Fear of current or ex partner: Not on file     Emotionally abused: Not on file     Physically abused: Not on file     Forced sexual activity: Not on file   Other Topics Concern    Not on file   Social History Narrative    Not on file      Family History   Problem Relation Age of Onset    Hypertension Mother     Hyperlipidemia Mother     Diabetes Mother     Prostate cancer Father      Past Surgical History:   Procedure Laterality Date    ABDOMINAL SURGERY      Tummy Tuck    DILATION AND CURETTAGE OF UTERUS      EGD      AR LAP, DEMETRIUS RESTRICT PROC, LONGITUDINAL GASTRECTOMY N/A 2021    Procedure: LAPAROSCOPIC SLEEVE GASTRECTOMY WITH ROBOTICS;  Surgeon: Torin Sarmiento MD;  Location: [facility];  Service: Bariatrics    TUBAL LIGATION         Current Outpatient Medications:     amLODIPine (NORVASC) 5 mg tablet, Take 5 mg by mouth daily, Disp: , Rfl:     metoprolol succinate (TOPROL-XL) 25 mg 24 hr tablet, Take 0 5 tablets (12 5 mg total) by mouth daily, Disp: 15 tablet, Rfl: 0    omeprazole (PriLOSEC) 20 mg delayed release capsule, Take 1 capsule (20 mg total) by mouth daily, Disp: 30 capsule, Rfl: 3    VITAMIN D, CHOLECALCIFEROL, PO, Take by mouth daily, Disp: , Rfl:     enoxaparin (LOVENOX) 40 mg/0 4 mL, Inject 0 4 mL (40 mg total) under the skin daily for 13 days, Disp: 5 2 mL, Rfl: 0    losartan (COZAAR) 100 MG tablet, Take 0 5 tablets (50 mg total) by mouth daily (Patient not taking: Reported on 6/3/2021), Disp: 15 tablet, Rfl: 0    Multiple Vitamin (multivitamin) tablet, Take 1 tablet by mouth daily, Disp: , Rfl:   No Known Allergies    Labs:  Admission on 05/18/2021, Discharged on 05/19/2021   Component Date Value    EXT Preg Test, Ur 05/18/2021 Negative     Control 05/18/2021 Valid     ABO Grouping 05/18/2021 O     Rh Factor 05/18/2021 Positive     Antibody Screen 05/18/2021 Negative     Specimen Expiration Date 05/18/2021 [id number]     Case Report 05/18/2021                      Value:Surgical Pathology Report                         Case: [id number]                                   Authorizing Provider:  Nick Nolan MD         Collected:           05/18/2021 9985              Ordering Location:     [facility]        Received:            05/18/2021 [address] [place] [address] Operating Room                                                     Pathologist:           Lucy Taylor MD                                                                 Specimen:    Stomach, PORTION OF STOMACH                                                                Final Diagnosis 05/18/2021                      Value: This result contains rich text formatting which cannot be displayed here   Additional Information 05/18/2021                      Value: This result contains rich text formatting which cannot be displayed here  Wash Caller Gross Description 05/18/2021                      Value: This result contains rich text formatting which cannot be displayed here      Sodium 05/19/2021 137     Potassium 05/19/2021 3 8     Chloride 05/19/2021 103     CO2 05/19/2021 24     ANION GAP 05/19/2021 10     BUN 05/19/2021 6     Creatinine 05/19/2021 0 68     Glucose 05/19/2021 109     Calcium 05/19/2021 9 0     eGFR 05/19/2021 108     WBC 05/19/2021 9 00     RBC 05/19/2021 4 13     Hemoglobin 05/19/2021 13 4     Hematocrit 05/19/2021 42 7     MCV 05/19/2021 103*    MCH 05/19/2021 32 4     MCHC 05/19/2021 31 4     RDW 05/19/2021 15 0     Platelets 79/57/2028 244     MPV 05/19/2021 11 5     Magnesium 05/19/2021 2 3     Ventricular Rate 05/19/2021 82     Atrial Rate 05/19/2021 82     AR Interval 05/19/2021 152     QRSD Interval 05/19/2021 84     QT Interval 05/19/2021 452     QTC Interval 05/19/2021 528     P Axis 05/19/2021 52     QRS Axis 05/19/2021 -8     T Wave Axis 05/19/2021 30     Troponin I 05/19/2021 <0 02     TSH 3RD GENERATON 05/19/2021 0 994    Orders Only on 05/11/2021   Component Date Value    SARS-CoV-2 05/11/2021 Negative    Hospital Outpatient Visit on 04/14/2021   Component Date Value    EXT Preg Test, Ur 04/14/2021 Negative     Control 04/14/2021 Valid     Case Report 04/14/2021                      Value:Surgical Pathology Report                         Case: [id number]                                   Authorizing Provider:  Opal Schroeder MD         Collected:           04/14/2021 1023              Ordering Location:     [facility]        Received:            04/14/2021 [address] [place] [address] Endoscopy                                                          Pathologist:           Parker Lynne MD                                                               Specimen:    Stomach, r/o h pylori                                                                      Final Diagnosis 04/14/2021                      Value: This result contains rich text formatting which cannot be displayed here   Additional Information 04/14/2021                      Value: This result contains rich text formatting which cannot be displayed here     Tyler Zamudio Description 04/14/2021 Value:This result contains rich text formatting which cannot be displayed here  Imaging: No results found  Review of Systems:  Review of Systems   All other systems reviewed and are negative  Physical Exam:  Physical Exam  Constitutional:       Appearance: She is obese  HENT:      Head: Normocephalic  Eyes:      Pupils: Pupils are equal, round, and reactive to light  Neck:      Musculoskeletal: Normal range of motion  Cardiovascular:      Rate and Rhythm: Normal rate and regular rhythm  Pulses: Normal pulses  Heart sounds: Normal heart sounds  Pulmonary:      Effort: Pulmonary effort is normal       Breath sounds: Normal breath sounds  Abdominal:      General: Bowel sounds are normal       Palpations: Abdomen is soft  Musculoskeletal: Normal range of motion  Right lower leg: No edema  Left lower leg: No edema  Skin:     General: Skin is warm and dry  Capillary Refill: Capillary refill takes less than 2 seconds  Neurological:      General: No focal deficit present  Mental Status: She is alert and oriented to person, place, and time  Psychiatric:         Mood and Affect: Mood normal          Behavior: Behavior normal          Discussion/Summary:  1  PVC- EKG shows NSR without ectopy, continue on   Metoprolol  succinate 12 5 mg daily, 24 hour Holter monitor evaluate heart rate rhythm and burden ectopy    2  Hypertension, RUE sitting 110/70,   Continue on Norvasc 5 mg daily, metoprolol succinate 12 5 mg daily

## 2021-06-03 NOTE — TELEPHONE ENCOUNTER
Patient scheduled for [facility] virtual visit  Patient did not sign on  RD called patient  Patient requested for office to call patient to reschedule as she is currently at an appointment with her PCP  RD notified front office to call patient

## 2021-06-07 ENCOUNTER — TELEPHONE (OUTPATIENT)
Dept: NON INVASIVE DIAGNOSTICS | Facility: HOSPITAL | Age: 41
End: 2021-06-07

## 2021-06-10 ENCOUNTER — OFFICE VISIT (OUTPATIENT)
Dept: BARIATRICS | Facility: CLINIC | Age: 41
End: 2021-06-10

## 2021-06-10 VITALS — HEIGHT: 64 IN | WEIGHT: 198.3 LBS | BODY MASS INDEX: 33.86 KG/M2

## 2021-06-10 DIAGNOSIS — E66.9 OBESITY, CLASS II, BMI 35-39.9: Primary | ICD-10-CM

## 2021-06-10 PROCEDURE — RECHECK

## 2021-06-10 NOTE — PROGRESS NOTES
Weight Management Nutrition Class     Diagnosis: Morbid Obesity    Bariatric Surgeon: Dr Romy Dominguez    Surgery: Vertical Sleeve Gastrectomy    Class: 5 week post op     Topics discussed today include:     fluid goals post op, protein goals post op, constipation, chew food well, exercise, avoidance of alcohol, PPI use, diet progression, hypoglycemia, dumping syndrome, protein supplems, vitamin/mineral supplements and calcium supplements    Patient was able to verbalize basic diet (protein, fluid, vitamin and mineral) recommendations and possible nutrition-related complications   Yes

## 2021-11-02 ENCOUNTER — TELEPHONE (OUTPATIENT)
Dept: BARIATRICS | Facility: CLINIC | Age: 41
End: 2021-11-02

## 2021-12-21 ENCOUNTER — OFFICE VISIT (OUTPATIENT)
Dept: URGENT CARE | Age: 41
End: 2021-12-21
Payer: COMMERCIAL

## 2021-12-21 VITALS — OXYGEN SATURATION: 97 % | HEART RATE: 88 BPM | TEMPERATURE: 100.4 F | RESPIRATION RATE: 20 BRPM

## 2021-12-21 DIAGNOSIS — J06.9 VIRAL URI WITH COUGH: Primary | ICD-10-CM

## 2021-12-21 PROCEDURE — 99213 OFFICE O/P EST LOW 20 MIN: CPT

## 2021-12-21 PROCEDURE — 87636 SARSCOV2 & INF A&B AMP PRB: CPT

## 2022-08-05 ENCOUNTER — VBI (OUTPATIENT)
Dept: ADMINISTRATIVE | Facility: OTHER | Age: 42
End: 2022-08-05

## 2022-10-04 ENCOUNTER — TELEPHONE (OUTPATIENT)
Dept: BARIATRICS | Facility: CLINIC | Age: 42
End: 2022-10-04

## 2022-10-04 NOTE — TELEPHONE ENCOUNTER
----- Message from Robert Shin RN sent at 2022  6:24 AM EDT -----  Regardin year f/u appointment  Please contact patient to schedule a 1 year follow up appointment    Thank You

## 2023-01-26 ENCOUNTER — VBI (OUTPATIENT)
Dept: ADMINISTRATIVE | Facility: OTHER | Age: 43
End: 2023-01-26

## 2024-06-06 ENCOUNTER — VBI (OUTPATIENT)
Dept: ADMINISTRATIVE | Facility: OTHER | Age: 44
End: 2024-06-06

## 2024-12-20 ENCOUNTER — TELEPHONE (OUTPATIENT)
Dept: INTERNAL MEDICINE CLINIC | Facility: CLINIC | Age: 44
End: 2024-12-20

## 2025-06-02 ENCOUNTER — VBI (OUTPATIENT)
Dept: ADMINISTRATIVE | Facility: OTHER | Age: 45
End: 2025-06-02

## 2025-06-02 NOTE — TELEPHONE ENCOUNTER
06/02/25 12:20 PM     Chart reviewed for CRC: Colonoscopy ; nothing is submitted to the patient's insurance at this time.     Jefferson Arechiga MA   [facility]

## (undated) DEVICE — TROCAR: Brand: KII FIOS FIRST ENTRY

## (undated) DEVICE — ENDOPATH PNEUMONEEDLE INSUFFLATION NEEDLES WITH LUER LOCK CONNECTORS 120MM: Brand: ENDOPATH

## (undated) DEVICE — INTENDED FOR TISSUE SEPARATION, AND OTHER PROCEDURES THAT REQUIRE A SHARP SURGICAL BLADE TO PUNCTURE OR CUT.: Brand: BARD-PARKER SAFETY BLADES SIZE 15, STERILE

## (undated) DEVICE — CADIERE FORCEPS: Brand: ENDOWRIST

## (undated) DEVICE — ANTI-FOG SOLUTION WITH FOAM PAD: Brand: DEVON

## (undated) DEVICE — VIOLET BRAIDED (POLYGLACTIN 910), SYNTHETIC ABSORBABLE SUTURE: Brand: COATED VICRYL

## (undated) DEVICE — STRL COTTON TIP APPLCTR 6IN PK: Brand: CARDINAL HEALTH

## (undated) DEVICE — STAPLER 60: Brand: SUREFORM

## (undated) DEVICE — NEEDLE SPINAL18G X 3.5 IN QUINCKE

## (undated) DEVICE — ABSORBABLE WOUND CLOSURE DEVICE: Brand: V-LOC 90

## (undated) DEVICE — BLADELESS OBTURATOR: Brand: WECK VISTA

## (undated) DEVICE — PMI DISPOSABLE PUNCTURE CLOSURE DEVICE / SUTURE GRASPER: Brand: PMI

## (undated) DEVICE — [HIGH FLOW INSUFFLATOR,  DO NOT USE IF PACKAGE IS DAMAGED,  KEEP DRY,  KEEP AWAY FROM SUNLIGHT,  PROTECT FROM HEAT AND RADIOACTIVE SOURCES.]: Brand: PNEUMOSURE

## (undated) DEVICE — CANNULA SEAL

## (undated) DEVICE — MEGA SUTURECUT ND: Brand: ENDOWRIST

## (undated) DEVICE — IRRIG ENDO FLO TUBING

## (undated) DEVICE — GLOVE SRG BIOGEL 7

## (undated) DEVICE — ADHESIVE SKIN CLSR DERMABOND NX

## (undated) DEVICE — STAPLER 60 RELOAD GREEN: Brand: SUREFORM

## (undated) DEVICE — WEBRIL 6 IN UNSTERILE

## (undated) DEVICE — BETHLEHEM UNIVERSAL MINOR GEN: Brand: CARDINAL HEALTH

## (undated) DEVICE — GLOVE SRG BIOGEL 7.5

## (undated) DEVICE — 10 MM BABCOCKS WITH RATCHET HANDLES: Brand: ENDOPATH

## (undated) DEVICE — STAPLER CANNULA SEAL: Brand: ENDOWRIST

## (undated) DEVICE — ARM DRAPE

## (undated) DEVICE — CHLORAPREP HI-LITE 26ML ORANGE

## (undated) DEVICE — ASTOUND STANDARD SURGICAL GOWN, XL: Brand: CONVERTORS

## (undated) DEVICE — SCD SEQUENTIAL COMPRESSION COMFORT SLEEVE MEDIUM KNEE LENGTH: Brand: KENDALL SCD

## (undated) DEVICE — STAPLER 60 RELOAD BLUE: Brand: SUREFORM

## (undated) DEVICE — PENCIL ELECTROSURG E-Z CLEAN -0035H

## (undated) DEVICE — VIAL DECANTER

## (undated) DEVICE — DRAPE TOWEL: Brand: CONVERTORS

## (undated) DEVICE — GLOVE INDICATOR PI UNDERGLOVE SZ 7 BLUE

## (undated) DEVICE — VESSEL SEALER EXTEND: Brand: ENDOWRIST

## (undated) DEVICE — VISIGI 3D®  CALIBRATION SYSTEM  SIZE 36FR SLEEVE/STD: Brand: BOEHRINGER® VISIGI 3D™ SLEEVE GASTRECTOMY CALIBRATION SYSTEM, SIZE 36FR

## (undated) DEVICE — SYRINGE 20ML LL

## (undated) DEVICE — TUBING SMOKE EVAC W/FILTRATION DEVICE PLUMEPORT ACTIV

## (undated) DEVICE — SUT MONOCRYL 4-0 PS-2 27 IN Y426H

## (undated) DEVICE — COLUMN DRAPE

## (undated) DEVICE — REDUCER: Brand: ENDOWRIST

## (undated) DEVICE — TIBURON LAPAROSCOPIC ABDOMINAL DRAPE: Brand: CONVERTORS